# Patient Record
Sex: MALE | Race: WHITE | Employment: UNEMPLOYED | URBAN - METROPOLITAN AREA
[De-identification: names, ages, dates, MRNs, and addresses within clinical notes are randomized per-mention and may not be internally consistent; named-entity substitution may affect disease eponyms.]

---

## 2022-07-06 ENCOUNTER — HOSPITAL ENCOUNTER (INPATIENT)
Age: 32
LOS: 2 days | Discharge: LEFT BEFORE DISCHARGE | DRG: 244 | End: 2022-07-08
Attending: EMERGENCY MEDICINE | Admitting: INTERNAL MEDICINE
Payer: MEDICAID

## 2022-07-06 ENCOUNTER — APPOINTMENT (OUTPATIENT)
Dept: CT IMAGING | Age: 32
DRG: 244 | End: 2022-07-06
Payer: MEDICAID

## 2022-07-06 DIAGNOSIS — K57.20 DIVERTICULITIS OF COLON WITH PERFORATION: Primary | ICD-10-CM

## 2022-07-06 LAB
ABSOLUTE EOS #: 0.23 K/UL (ref 0–0.4)
ABSOLUTE LYMPH #: 2.55 K/UL (ref 1–4.8)
ABSOLUTE MONO #: 1.86 K/UL (ref 0.1–1.3)
ALBUMIN SERPL-MCNC: 4 G/DL (ref 3.5–5.2)
ALP BLD-CCNC: 71 U/L (ref 40–129)
ALT SERPL-CCNC: 11 U/L (ref 5–41)
ANION GAP SERPL CALCULATED.3IONS-SCNC: 10 MMOL/L (ref 9–17)
AST SERPL-CCNC: 8 U/L
BASOPHILS # BLD: 0 % (ref 0–2)
BASOPHILS ABSOLUTE: 0 K/UL (ref 0–0.2)
BILIRUB SERPL-MCNC: 0.56 MG/DL (ref 0.3–1.2)
BUN BLDV-MCNC: 10 MG/DL (ref 6–20)
CALCIUM SERPL-MCNC: 9.6 MG/DL (ref 8.6–10.4)
CHLORIDE BLD-SCNC: 97 MMOL/L (ref 98–107)
CO2: 28 MMOL/L (ref 20–31)
CREAT SERPL-MCNC: 0.83 MG/DL (ref 0.7–1.2)
EOSINOPHILS RELATIVE PERCENT: 1 % (ref 0–4)
GFR AFRICAN AMERICAN: >60 ML/MIN
GFR NON-AFRICAN AMERICAN: >60 ML/MIN
GFR SERPL CREATININE-BSD FRML MDRD: ABNORMAL ML/MIN/{1.73_M2}
GLUCOSE BLD-MCNC: 113 MG/DL (ref 70–99)
HCT VFR BLD CALC: 44.8 % (ref 41–53)
HEMOGLOBIN: 15.1 G/DL (ref 13.5–17.5)
LIPASE: 10 U/L (ref 13–60)
LYMPHOCYTES # BLD: 11 % (ref 24–44)
MCH RBC QN AUTO: 29.8 PG (ref 26–34)
MCHC RBC AUTO-ENTMCNC: 33.7 G/DL (ref 31–37)
MCV RBC AUTO: 88.5 FL (ref 80–100)
MONOCYTES # BLD: 8 % (ref 1–7)
MORPHOLOGY: NORMAL
PDW BLD-RTO: 13.3 % (ref 11.5–14.9)
PLATELET # BLD: 436 K/UL (ref 150–450)
PMV BLD AUTO: 7.1 FL (ref 6–12)
POTASSIUM SERPL-SCNC: 3.9 MMOL/L (ref 3.7–5.3)
RBC # BLD: 5.06 M/UL (ref 4.5–5.9)
SEG NEUTROPHILS: 80 % (ref 36–66)
SEGMENTED NEUTROPHILS ABSOLUTE COUNT: 18.56 K/UL (ref 1.3–9.1)
SODIUM BLD-SCNC: 135 MMOL/L (ref 135–144)
TOTAL PROTEIN: 8.1 G/DL (ref 6.4–8.3)
WBC # BLD: 23.2 K/UL (ref 3.5–11)

## 2022-07-06 PROCEDURE — 85025 COMPLETE CBC W/AUTO DIFF WBC: CPT

## 2022-07-06 PROCEDURE — 83690 ASSAY OF LIPASE: CPT

## 2022-07-06 PROCEDURE — 2060000000 HC ICU INTERMEDIATE R&B

## 2022-07-06 PROCEDURE — 80053 COMPREHEN METABOLIC PANEL: CPT

## 2022-07-06 PROCEDURE — 2580000003 HC RX 258: Performed by: PHYSICIAN ASSISTANT

## 2022-07-06 PROCEDURE — 36415 COLL VENOUS BLD VENIPUNCTURE: CPT

## 2022-07-06 PROCEDURE — 99223 1ST HOSP IP/OBS HIGH 75: CPT | Performed by: INTERNAL MEDICINE

## 2022-07-06 PROCEDURE — 74177 CT ABD & PELVIS W/CONTRAST: CPT

## 2022-07-06 PROCEDURE — 6360000002 HC RX W HCPCS: Performed by: SURGERY

## 2022-07-06 PROCEDURE — 2580000003 HC RX 258: Performed by: SURGERY

## 2022-07-06 PROCEDURE — 96374 THER/PROPH/DIAG INJ IV PUSH: CPT

## 2022-07-06 PROCEDURE — 2500000003 HC RX 250 WO HCPCS: Performed by: PHYSICIAN ASSISTANT

## 2022-07-06 PROCEDURE — 6360000004 HC RX CONTRAST MEDICATION: Performed by: PHYSICIAN ASSISTANT

## 2022-07-06 PROCEDURE — 99285 EMERGENCY DEPT VISIT HI MDM: CPT

## 2022-07-06 RX ORDER — FAMOTIDINE 10 MG/ML
20 INJECTION, SOLUTION INTRAVENOUS 2 TIMES DAILY
Status: DISCONTINUED | OUTPATIENT
Start: 2022-07-06 | End: 2022-07-08 | Stop reason: HOSPADM

## 2022-07-06 RX ORDER — ONDANSETRON 2 MG/ML
4 INJECTION INTRAMUSCULAR; INTRAVENOUS EVERY 6 HOURS PRN
Status: DISCONTINUED | OUTPATIENT
Start: 2022-07-06 | End: 2022-07-08 | Stop reason: HOSPADM

## 2022-07-06 RX ORDER — SODIUM CHLORIDE 0.9 % (FLUSH) 0.9 %
5-40 SYRINGE (ML) INJECTION PRN
Status: DISCONTINUED | OUTPATIENT
Start: 2022-07-06 | End: 2022-07-08 | Stop reason: HOSPADM

## 2022-07-06 RX ORDER — 0.9 % SODIUM CHLORIDE 0.9 %
1000 INTRAVENOUS SOLUTION INTRAVENOUS ONCE
Status: COMPLETED | OUTPATIENT
Start: 2022-07-06 | End: 2022-07-06

## 2022-07-06 RX ORDER — SODIUM CHLORIDE 9 MG/ML
INJECTION, SOLUTION INTRAVENOUS CONTINUOUS
Status: DISCONTINUED | OUTPATIENT
Start: 2022-07-06 | End: 2022-07-08 | Stop reason: HOSPADM

## 2022-07-06 RX ORDER — ACETAMINOPHEN 325 MG/1
650 TABLET ORAL EVERY 4 HOURS PRN
Status: DISCONTINUED | OUTPATIENT
Start: 2022-07-06 | End: 2022-07-08 | Stop reason: HOSPADM

## 2022-07-06 RX ORDER — SODIUM CHLORIDE 9 MG/ML
INJECTION, SOLUTION INTRAVENOUS PRN
Status: DISCONTINUED | OUTPATIENT
Start: 2022-07-06 | End: 2022-07-08 | Stop reason: HOSPADM

## 2022-07-06 RX ORDER — FENTANYL CITRATE 50 UG/ML
100 INJECTION, SOLUTION INTRAMUSCULAR; INTRAVENOUS
Status: DISCONTINUED | OUTPATIENT
Start: 2022-07-06 | End: 2022-07-08 | Stop reason: HOSPADM

## 2022-07-06 RX ORDER — ONDANSETRON 4 MG/1
4 TABLET, ORALLY DISINTEGRATING ORAL EVERY 8 HOURS PRN
Status: DISCONTINUED | OUTPATIENT
Start: 2022-07-06 | End: 2022-07-08 | Stop reason: HOSPADM

## 2022-07-06 RX ORDER — SODIUM CHLORIDE 0.9 % (FLUSH) 0.9 %
10 SYRINGE (ML) INJECTION PRN
Status: DISCONTINUED | OUTPATIENT
Start: 2022-07-06 | End: 2022-07-08 | Stop reason: HOSPADM

## 2022-07-06 RX ORDER — 0.9 % SODIUM CHLORIDE 0.9 %
80 INTRAVENOUS SOLUTION INTRAVENOUS ONCE
Status: COMPLETED | OUTPATIENT
Start: 2022-07-06 | End: 2022-07-06

## 2022-07-06 RX ORDER — SODIUM CHLORIDE 0.9 % (FLUSH) 0.9 %
5-40 SYRINGE (ML) INJECTION EVERY 12 HOURS SCHEDULED
Status: DISCONTINUED | OUTPATIENT
Start: 2022-07-06 | End: 2022-07-08 | Stop reason: HOSPADM

## 2022-07-06 RX ORDER — FENTANYL CITRATE 50 UG/ML
50 INJECTION, SOLUTION INTRAMUSCULAR; INTRAVENOUS
Status: DISCONTINUED | OUTPATIENT
Start: 2022-07-06 | End: 2022-07-08 | Stop reason: HOSPADM

## 2022-07-06 RX ADMIN — SODIUM CHLORIDE: 9 INJECTION, SOLUTION INTRAVENOUS at 17:16

## 2022-07-06 RX ADMIN — SODIUM CHLORIDE 80 ML: 9 INJECTION, SOLUTION INTRAVENOUS at 15:05

## 2022-07-06 RX ADMIN — SODIUM CHLORIDE, PRESERVATIVE FREE 10 ML: 5 INJECTION INTRAVENOUS at 15:05

## 2022-07-06 RX ADMIN — IOPAMIDOL 75 ML: 755 INJECTION, SOLUTION INTRAVENOUS at 15:02

## 2022-07-06 RX ADMIN — SODIUM CHLORIDE 1000 ML: 9 INJECTION, SOLUTION INTRAVENOUS at 14:17

## 2022-07-06 RX ADMIN — SODIUM CHLORIDE, PRESERVATIVE FREE 10 ML: 5 INJECTION INTRAVENOUS at 20:01

## 2022-07-06 RX ADMIN — FAMOTIDINE 20 MG: 10 INJECTION INTRAVENOUS at 20:01

## 2022-07-06 RX ADMIN — MEROPENEM 2000 MG: 1 INJECTION, POWDER, FOR SOLUTION INTRAVENOUS at 17:27

## 2022-07-06 ASSESSMENT — PAIN DESCRIPTION - ORIENTATION: ORIENTATION: LEFT

## 2022-07-06 ASSESSMENT — PAIN DESCRIPTION - FREQUENCY
FREQUENCY: CONTINUOUS
FREQUENCY: INTERMITTENT

## 2022-07-06 ASSESSMENT — PAIN - FUNCTIONAL ASSESSMENT: PAIN_FUNCTIONAL_ASSESSMENT: 0-10

## 2022-07-06 ASSESSMENT — PAIN DESCRIPTION - LOCATION
LOCATION: ABDOMEN

## 2022-07-06 ASSESSMENT — PAIN SCALES - GENERAL
PAINLEVEL_OUTOF10: 5
PAINLEVEL_OUTOF10: 7
PAINLEVEL_OUTOF10: 5

## 2022-07-06 ASSESSMENT — PAIN DESCRIPTION - DESCRIPTORS
DESCRIPTORS: ACHING
DESCRIPTORS: ACHING
DESCRIPTORS: PRESSURE

## 2022-07-06 ASSESSMENT — PAIN DESCRIPTION - PAIN TYPE
TYPE: ACUTE PAIN
TYPE: ACUTE PAIN

## 2022-07-06 NOTE — ED TRIAGE NOTES
Mode of arrival (squad #, walk in, police, etc) : walk in        Chief complaint(s): Constipation        Arrival Note (brief scenario, treatment PTA, etc). : Pt states he has had worsening constipation for 1.5-2 weeks. Pt has tried miralax, enema, stool softners, and mag citrate with no relief. C= \"Have you ever felt that you should Cut down on your drinking? \"  No  A= \"Have people Annoyed you by criticizing your drinking? \"  No  G= \"Have you ever felt bad or Guilty about your drinking? \"  No  E= \"Have you ever had a drink as an Eye-opener first thing in the morning to steady your nerves or to help a hangover? \"  No      Deferred []      Reason for deferring: N/A    *If yes to two or more: probable alcohol abuse. *

## 2022-07-06 NOTE — H&P
reports current alcohol use. He reports that he does not use drugs. HOME MEDICATIONS        Prior to Admission medications    Not on File       ALLERGIES      Patient has no known allergies. REVIEW OF SYSTEMS     Review of Systems   Constitutional: Negative for chills, diaphoresis and fever. HENT: Negative for congestion and sore throat. Respiratory: Negative for cough, shortness of breath and wheezing. Cardiovascular: Negative for chest pain, palpitations and leg swelling. Gastrointestinal: Positive for abdominal pain and constipation. Negative for diarrhea, nausea and vomiting. Genitourinary: Negative for dysuria, frequency and urgency. Musculoskeletal: Negative for back pain and myalgias. Skin: Negative for rash. Neurological: Negative for dizziness, weakness and headaches. Psychiatric/Behavioral: The patient is not nervous/anxious. PHYSICAL EXAM      BP (!) 144/91   Pulse 83   Temp 97.9 °F (36.6 °C) (Oral)   Resp 18   Ht 5' 10\" (1.778 m)   Wt 200 lb (90.7 kg)   SpO2 98%   BMI 28.70 kg/m²  Body mass index is 28.7 kg/m². Physical Exam  Constitutional:       General: He is not in acute distress. Appearance: He is well-developed. He is not diaphoretic. HENT:      Head: Normocephalic and atraumatic. Eyes:      Conjunctiva/sclera: Conjunctivae normal.      Pupils: Pupils are equal, round, and reactive to light. Neck:      Trachea: No tracheal deviation. Cardiovascular:      Rate and Rhythm: Normal rate and regular rhythm. Heart sounds: Normal heart sounds. No murmur heard. No friction rub. No gallop. Pulmonary:      Effort: Pulmonary effort is normal. No respiratory distress. Breath sounds: Normal breath sounds. No wheezing or rales. Chest:      Chest wall: No tenderness. Abdominal:      General: There is no distension. Palpations: Abdomen is soft. Tenderness: There is abdominal tenderness (LLQ; tingling over entire abdomen).  There is no guarding. Comments: Hypoactive bowel sounds x 4 quads   Musculoskeletal:         General: No tenderness. Normal range of motion. Cervical back: Normal range of motion and neck supple. Lymphadenopathy:      Cervical: No cervical adenopathy. Skin:     General: Skin is warm and dry. Coloration: Skin is not pale. Findings: No erythema or rash. Neurological:      Mental Status: He is alert and oriented to person, place, and time. Motor: No seizure activity. Coordination: Coordination normal.   Psychiatric:         Behavior: Behavior normal.         Thought Content: Thought content normal.       DIAGNOSTICS      EKG: none    Labs:  CBC:   Recent Labs     07/06/22  1415   WBC 23.2*   HGB 15.1        BMP:    Recent Labs     07/06/22  1415      K 3.9   CL 97*   CO2 28   BUN 10   CREATININE 0.83   GLUCOSE 113*     S. Calcium:  Recent Labs     07/06/22  1415   CALCIUM 9.6     S. Ionized Calcium:No results for input(s): IONCA in the last 72 hours. S. Magnesium:No results for input(s): MG in the last 72 hours. S. Phosphorus:No results for input(s): PHOS in the last 72 hours. S. Glucose:No results for input(s): POCGLU in the last 72 hours. Glycosylated hemoglobin A1C: No results found for: LABA1C  Hepatic:   Recent Labs     07/06/22  1415   AST 8   ALT 11   ALKPHOS 71     CARDIAC ENZY: No results for input(s): CKTOTAL, CKMB, CKMBINDEX, TROPHS, MYOGLOBIN in the last 72 hours. INR: No results for input(s): INR in the last 72 hours. BNP: No results for input(s): PROBNP in the last 72 hours. ABGs: No results for input(s): PH, PCO2, PO2, HCO3, O2SAT in the last 72 hours. Lipids: No results for input(s): CHOL, TRIG, HDL, LDL, LDLCALC in the last 72 hours. Pancreatic functions:  Recent Labs     07/06/22  1415   LIPASE 10*     S. LacticAcid: No results for input(s): LACTA in the last 72 hours.   Thyroid functions: No results found for: T4, TSH   U/A:No results for input(s): NITRITE, COLORU, WBCUA, RBCUA, MUCUS, BACTERIA, CLARITYU, SPECGRAV, LEUKOCYTESUR, BLOODU, GLUCOSEU, AMORPHOUS in the last 72 hours. Invalid input(s): Kimberly Heller  No results for input(s): COVID19 in the last 72 hours. Imaging/Diagonstics:     CT ABDOMEN PELVIS W IV CONTRAST Additional Contrast? None    Result Date: 7/6/2022  EXAMINATION: CT OF THE ABDOMEN AND PELVIS WITH CONTRAST 7/6/2022 2:55 pm TECHNIQUE: CT of the abdomen and pelvis was performed with the administration of intravenous contrast. Multiplanar reformatted images are provided for review. Automated exposure control, iterative reconstruction, and/or weight based adjustment of the mA/kV was utilized to reduce the radiation dose to as low as reasonably achievable. COMPARISON: None. HISTORY: ORDERING SYSTEM PROVIDED HISTORY: constipation, LLQ abd pain TECHNOLOGIST PROVIDED HISTORY: constipation, LLQ abd pain Decision Support Exception - unselect if not a suspected or confirmed emergency medical condition->Emergency Medical Condition (MA) Reason for Exam: constipation, LLQ abd pain Additional signs and symptoms: PT STATES HE HASN'T HAD A BOWEL MOVEMENT IN 10 DAYS, GENEALIZED ABDOMINAL PAIN FINDINGS: Lower Chest: No focal consolidation seen in the visualized lung bases. Organs: The visualized portions of the liver, gallbladder, spleen, pancreas and adrenal glands demonstrate no acute abnormality. No biliary ductal dilatation. The kidneys appear normal in size and demonstrate symmetric enhancement. No focal renal mass. No hydronephrosis. No perinephric stranding. GI/Bowel: No bowel dilatation is seen to suggest a bowel obstruction. No evidence of acute appendicitis. There is thickening of the sigmoid colon with a focal area of decreased attenuation adjacent to the sigmoid colonic wall measuring approximately 3.4 x 4.5 x 4.5 cm. This appears to demonstrate central foci of air with moderate surrounding inflammatory change. Pelvis:  The urinary bladder and prostate demonstrate no acute abnormality. Peritoneum/Retroperitoneum: The abdominal aorta is normal in caliber. No bulky retroperitoneal or mesenteric adenopathy. No gross free fluid or free air within the abdomen or pelvis. Bones/Soft Tissues: No acute osseous abnormality. 1. There is presumed contained perforated diverticulitis involving the sigmoid colon with a somewhat ill-defined presume fluid collection and tiny foci of air adjacent to the sigmoid colonic wall measuring up to 4.5 cm with surrounding inflammatory change. 2. Otherwise, no acute abnormality seen within the abdomen or pelvis. 3. No bowel dilatation is seen to suggest a bowel obstruction. ASSESSMENT  and  PLAN     Principal Problem:    Perforation of sigmoid colon due to diverticulitis  Resolved Problems:    * No resolved hospital problems. *    Plan:    Perforation of sigmoid colon due to diverticulitis  -2 week history abdominal pain and constipation  --precipitated by heavy lifting and eating large amounts of trail mix  -CT abdomen/pelvis - Presumed contained perforated  Diverticulitis involving sigmoid colon with a somewhat ill defined presumed fluid collection;   --tiny foci of air adjacent to sigmoid colonic wall- up to 4.5 cm with surrounding inflammatory change  --No other acute abnormality seen  --No bowel dilation to suggest obstruction  -WBC 23.2  -Meropenem initiated in ED  --Continue on admission  -General surgery consulted in ED  -NPO except ice chips  -NS at 150 ml/hr   -IR for percutaneous abscess drainage in am    Consultations:     [address] TIFFANY Arevalo - CNP   7/6/2022  7:48 PM    Kaela Harvey [address] [place] [address]. Phone [phone]     Attending Physician Statement  I have discussed the care of Zackery Purvis with the CNP.  I have examined the patient myself and taken ros and hpi , including pertinent

## 2022-07-06 NOTE — ED PROVIDER NOTES
[facility]  eMERGENCY dEPARTMENT eNCOUnter      Pt Name: Darin Spann  MRN: [id number]  [place] 1990  Date of evaluation: 7/6/2022  Provider: Kadeem Alcantara PA-C    CHIEF COMPLAINT       Chief Complaint   Patient presents with    Constipation           HISTORY OF PRESENT ILLNESS  (Location/Symptom, Timing/Onset, Context/Setting, Quality, Duration, Modifying Factors, Severity.)   Darin Spann is a 32 y.o. male who presents to the emergency department for evaluation of constipation and abdominal pain. Symptoms started 1-1/2 to 2 weeks ago. Patient states he is still able to have a bowel movement daily but it is small hard \"christiana. \"  Patient reports he is barely passing any gas. States he has been burping a lot. Patient states his abdominal pain initially started out in his right upper quadrant but has radiated down his stomach and is now into his left lower quadrant. He states yesterday he tried MiraLAX, stool softener, enema with no relief. Patient did go to [facility] early this morning where he was sent home with mag citrate. Patient states he did take it and threw up 3 hours later. Patient has not had a bowel movement since taking mag citrate. Patient denies any chest pain, shortness of breath or fevers. Patient reports he normally has a bowel movement once a day. He denies any medical history. No history of abdominal surgeries. No other complaints. Nursing Notes were reviewed. REVIEW OF SYSTEMS    (2-9 systems for level 4, 10 or more for level 5)     Review of Systems   abd pain  Constipation      Except as noted above the remainder of the review of systems was reviewed and negative. PAST MEDICAL HISTORY   History reviewed. No pertinent past medical history. None otherwise stated in nurses notes    SURGICAL HISTORY     History reviewed. No pertinent surgical history.   None otherwise stated in nurses notes    CURRENT MEDICATIONS       There are no discharge medications for this patient. ALLERGIES     Patient has no known allergies. FAMILY HISTORY     History reviewed. No pertinent family history. No family status information on file. None otherwise stated in nurses notes    SOCIAL HISTORY         lives at home with others     PHYSICAL EXAM    (up to 7 for level 4, 8 or more for level 5)     ED Triage Vitals [07/06/22 1343]   BP Temp Temp src Heart Rate Resp SpO2 Height Weight   136/79 98.2 °F (36.8 °C) -- 83 16 98 % -- --       Physical Exam   Nursing note and vitals reviewed. Constitutional: Oriented to person, place, and time and well-developed, well-nourished. Head: Normocephalic and atraumatic. Ear: External ears normal.   Nose: Nose normal and midline. Eyes: Conjunctivae and EOM are normal. Pupils are equal, round, and reactive to light. Neck: Normal range of motion. Neck supple. Cardiovascular: Normal rate, regular rhythm, normal heart sounds and intact distal pulses. Pulmonary/Chest: Effort normal and breath sounds normal. No respiratory distress. No wheezes. No rales. No chest tenderness. Abdominal: Soft. Bowel sounds are normal. No distension and no mass. There is no LLQ and suprapubic tenderness. There is no rebound and no guarding. No rigidity. Musculoskeletal: Normal range of motion. Neurological: Alert and oriented to person, place, and time. GCS score is 15. Skin: Skin is warm and dry. No rash noted. No erythema. No pallor. Psychiatric: Mood, memory, affect and judgment normal.           DIAGNOSTIC RESULTS     EKG: All EKG's are interpreted by the Emergency Department Physician who either signs or Co-signs this chart in the absence of a cardiologist.        RADIOLOGY:   All plain film, CT, MRI, and formal ultrasound images (except ED bedside ultrasound) are read by the radiologist, see reports below, unless otherwise noted in MDM or here. CT ABDOMEN PELVIS W IV CONTRAST Additional Contrast? None   Final Result   1. There is presumed contained perforated diverticulitis involving the   sigmoid colon with a somewhat ill-defined presume fluid collection and tiny   foci of air adjacent to the sigmoid colonic wall measuring up to 4.5 cm with   surrounding inflammatory change. 2. Otherwise, no acute abnormality seen within the abdomen or pelvis. 3. No bowel dilatation is seen to suggest a bowel obstruction. IR ABSCESS DRAINAGE PERC    (Results Pending)       No results found. LABS:  Labs Reviewed   CBC WITH AUTO DIFFERENTIAL - Abnormal; Notable for the following components:       Result Value    WBC 23.2 (*)     Seg Neutrophils 80 (*)     Lymphocytes 11 (*)     Monocytes 8 (*)     Segs Absolute 18.56 (*)     Absolute Mono # 1.86 (*)     All other components within normal limits   COMPREHENSIVE METABOLIC PANEL W/ REFLEX TO MG FOR LOW K - Abnormal; Notable for the following components:    Glucose 113 (*)     Chloride 97 (*)     All other components within normal limits   LIPASE - Abnormal; Notable for the following components:    Lipase 10 (*)     All other components within normal limits   CBC WITH AUTO DIFFERENTIAL   BASIC METABOLIC PANEL   PROTIME-INR       All other labs were within normal range or not returned as of this dictation.     EMERGENCY DEPARTMENT COURSE and DIFFERENTIAL DIAGNOSIS/MDM:   Vitals:    Vitals:    07/06/22 1441 07/06/22 1450 07/06/22 1712 07/06/22 1824   BP:   135/83 (!) 144/91   Pulse: 88 82 76 83   Resp:  17 17 18   Temp:  98.2 °F (36.8 °C) 98 °F (36.7 °C) 97.9 °F (36.6 °C)   TempSrc:  Oral Oral Oral   SpO2:  99% 99% 98%   Weight:       Height:             Patient instructed to return to the emergency room if symptoms worsen, return, or any other concern right away which is agreed by the patient    ED MEDS:  Orders Placed This Encounter   Medications    0.9 % sodium chloride bolus    sodium chloride flush 0.9 % injection 10 mL    iopamidol (ISOVUE-370) 76 % injection 75 mL    0.9 % sodium chloride bolus    0.9 % sodium chloride infusion    famotidine (PEPCID) injection 20 mg    ondansetron (ZOFRAN) injection 4 mg    DISCONTD: meropenem (MERREM) 1,000 mg in sodium chloride 0.9 % 100 mL IVPB     Order Specific Question:   Antimicrobial Indications     Answer:   Intra-Abdominal Infection    fentaNYL (SUBLIMAZE) injection 50 mcg    fentaNYL (SUBLIMAZE) injection 100 mcg    acetaminophen (TYLENOL) tablet 650 mg    FOLLOWED BY Linked Order Group     meropenem (MERREM) 2,000 mg in sodium chloride 0.9 % 100 mL IVPB      Order Specific Question:   Antimicrobial Indications      Answer:   Intra-Abdominal Infection     meropenem (MERREM) 1,000 mg in sodium chloride 0.9 % 100 mL IVPB (mini-bag)      Order Specific Question:   Antimicrobial Indications      Answer:   Intra-Abdominal Infection    sodium chloride flush 0.9 % injection 5-40 mL    sodium chloride flush 0.9 % injection 5-40 mL    0.9 % sodium chloride infusion    acetaminophen (TYLENOL) tablet 650 mg    OR Linked Order Group     ondansetron (ZOFRAN-ODT) disintegrating tablet 4 mg     ondansetron (ZOFRAN) injection 4 mg         CONSULTS:  IP CONSULT TO GENERAL SURGERY  IP CONSULT TO INTERNAL MEDICINE    PROCEDURES:  None      FINAL IMPRESSION      1. Diverticulitis of colon with perforation          DISPOSITION/PLAN   DISPOSITION Admitted    PATIENT REFERRED TO:  No follow-up provider specified. DISCHARGE MEDICATIONS:  There are no discharge medications for this patient. Summation      Patient Course:    Constipation x 2 weeks. Still passing some gas and able to have small hard BM. Pt tried mag citrate this morning but threw it up. Labs show WBC of 23. Ct scan is showing perforated sigmoid diverticulitis with abscess. I spoke with dr. Shahzad Willis. He would like patient admitted primarily to medicine. He will be on as a consult. He will also consult IR. Pt started on abx.    I spoke with Dr. Jaye Bustillo who accepted admission. Residents did not return page. Pt updated on plan. He is agreeable. Dr. Anuradha Walden evaluated patient in ED. The care is provided during an unprecedented national emergency due to the novel coronavirus, COVID-19. ED Medications administered this visit:    Medications   sodium chloride flush 0.9 % injection 10 mL (10 mLs IntraVENous Given 7/6/22 1505)   0.9 % sodium chloride infusion ( IntraVENous New Bag 7/6/22 1716)   famotidine (PEPCID) injection 20 mg (has no administration in time range)   ondansetron (ZOFRAN) injection 4 mg (has no administration in time range)   fentaNYL (SUBLIMAZE) injection 50 mcg (has no administration in time range)   fentaNYL (SUBLIMAZE) injection 100 mcg (has no administration in time range)   acetaminophen (TYLENOL) tablet 650 mg (has no administration in time range)   meropenem (MERREM) 2,000 mg in sodium chloride 0.9 % 100 mL IVPB (2,000 mg IntraVENous New Bag 7/6/22 1727)     Followed by   meropenem (MERREM) 1,000 mg in sodium chloride 0.9 % 100 mL IVPB (mini-bag) (has no administration in time range)   sodium chloride flush 0.9 % injection 5-40 mL (has no administration in time range)   sodium chloride flush 0.9 % injection 5-40 mL (has no administration in time range)   0.9 % sodium chloride infusion (has no administration in time range)   acetaminophen (TYLENOL) tablet 650 mg (has no administration in time range)   ondansetron (ZOFRAN-ODT) disintegrating tablet 4 mg (has no administration in time range)     Or   ondansetron (ZOFRAN) injection 4 mg (has no administration in time range)   0.9 % sodium chloride bolus (0 mLs IntraVENous Stopped 7/6/22 1530)   iopamidol (ISOVUE-370) 76 % injection 75 mL (75 mLs IntraVENous Given 7/6/22 1502)   0.9 % sodium chloride bolus (0 mLs IntraVENous Stopped 7/6/22 1506)       New Prescriptions from this visit:    There are no discharge medications for this patient.       Follow-up:  No follow-up provider specified. Final Impression:   1.  Diverticulitis of colon with perforation               (Please note that portions of this note were completed with a voice recognition program )        Ventura Morgan, [address] PA-C  07/06/22 2381

## 2022-07-06 NOTE — ED PROVIDER NOTES
Attending Supervising Physicians Attestation Statement  The patient met the criteria for indirect supervision. I discussed the findings and plans with the physician assistant and agree as documented in her note .     Electronically signed by Ad Montilla MD on 7/6/22 at 2:17 PM EDT          Ad Montilla MD  07/06/22 3001

## 2022-07-06 NOTE — ED NOTES
TRANSFER - OUT REPORT:    Verbal report given to [address] [place] [address] on Bard Peabody  being transferred to [facility] 2089 for routine progression of patient care       Report consisted of patient's Situation, Background, Assessment and   Recommendations(SBAR). Information from the following report(s) Nurse Handoff Report was reviewed with the receiving nurse. Lines:   Peripheral IV 07/06/22 Right Antecubital (Active)    Norml saline 1Liter  150ml/hour infusing    Opportunity for questions and clarification was provided.       Patient transported with:  Frances Ferreira RN  99/06/14 7380

## 2022-07-06 NOTE — PROGRESS NOTES
Medication History completed:    New medications: None    Medications discontinued: None    Changes to dosing: None    Stated allergies: NKDA    Other pertinent information: Prior to the ER, the patient reports to try taking miralax, magnesium citrate, an enema and stool softener for constipation. The patient refuses taking any prescription, OTC, herbal supplement or illicit drug use.        Thank you,  Lisa Mayfield  PharmD candidate [id number]

## 2022-07-07 LAB
ABSOLUTE EOS #: 0.38 K/UL (ref 0–0.4)
ABSOLUTE LYMPH #: 2.87 K/UL (ref 1–4.8)
ABSOLUTE MONO #: 1.72 K/UL (ref 0.1–1.3)
ANION GAP SERPL CALCULATED.3IONS-SCNC: 15 MMOL/L (ref 9–17)
BASOPHILS # BLD: 1 % (ref 0–2)
BASOPHILS ABSOLUTE: 0.19 K/UL (ref 0–0.2)
BUN BLDV-MCNC: 7 MG/DL (ref 6–20)
CALCIUM SERPL-MCNC: 9 MG/DL (ref 8.6–10.4)
CHLORIDE BLD-SCNC: 100 MMOL/L (ref 98–107)
CO2: 22 MMOL/L (ref 20–31)
CREAT SERPL-MCNC: 0.83 MG/DL (ref 0.7–1.2)
EOSINOPHILS RELATIVE PERCENT: 2 % (ref 0–4)
GFR AFRICAN AMERICAN: >60 ML/MIN
GFR NON-AFRICAN AMERICAN: >60 ML/MIN
GFR SERPL CREATININE-BSD FRML MDRD: ABNORMAL ML/MIN/{1.73_M2}
GLUCOSE BLD-MCNC: 105 MG/DL (ref 70–99)
HCT VFR BLD CALC: 45.4 % (ref 41–53)
HEMOGLOBIN: 14.8 G/DL (ref 13.5–17.5)
INR BLD: 1.1
LYMPHOCYTES # BLD: 15 % (ref 24–44)
MCH RBC QN AUTO: 29.8 PG (ref 26–34)
MCHC RBC AUTO-ENTMCNC: 32.5 G/DL (ref 31–37)
MCV RBC AUTO: 91.9 FL (ref 80–100)
MONOCYTES # BLD: 9 % (ref 1–7)
MORPHOLOGY: NORMAL
PDW BLD-RTO: 13.3 % (ref 11.5–14.9)
PLATELET # BLD: 398 K/UL (ref 150–450)
PMV BLD AUTO: 7.4 FL (ref 6–12)
POTASSIUM SERPL-SCNC: 3.8 MMOL/L (ref 3.7–5.3)
PROTHROMBIN TIME: 13.7 SEC (ref 11.8–14.6)
RBC # BLD: 4.94 M/UL (ref 4.5–5.9)
SEG NEUTROPHILS: 73 % (ref 36–66)
SEGMENTED NEUTROPHILS ABSOLUTE COUNT: 13.94 K/UL (ref 1.3–9.1)
SODIUM BLD-SCNC: 137 MMOL/L (ref 135–144)
WBC # BLD: 19.1 K/UL (ref 3.5–11)

## 2022-07-07 PROCEDURE — 85610 PROTHROMBIN TIME: CPT

## 2022-07-07 PROCEDURE — 2060000000 HC ICU INTERMEDIATE R&B

## 2022-07-07 PROCEDURE — 36415 COLL VENOUS BLD VENIPUNCTURE: CPT

## 2022-07-07 PROCEDURE — 85025 COMPLETE CBC W/AUTO DIFF WBC: CPT

## 2022-07-07 PROCEDURE — 2580000003 HC RX 258: Performed by: SURGERY

## 2022-07-07 PROCEDURE — 6360000002 HC RX W HCPCS: Performed by: SURGERY

## 2022-07-07 PROCEDURE — 2500000003 HC RX 250 WO HCPCS: Performed by: PHYSICIAN ASSISTANT

## 2022-07-07 PROCEDURE — 2580000003 HC RX 258: Performed by: PHYSICIAN ASSISTANT

## 2022-07-07 PROCEDURE — 80048 BASIC METABOLIC PNL TOTAL CA: CPT

## 2022-07-07 RX ORDER — ENOXAPARIN SODIUM 100 MG/ML
40 INJECTION SUBCUTANEOUS DAILY
Status: DISCONTINUED | OUTPATIENT
Start: 2022-07-07 | End: 2022-07-08 | Stop reason: HOSPADM

## 2022-07-07 RX ADMIN — MEROPENEM 1000 MG: 1 INJECTION, POWDER, FOR SOLUTION INTRAVENOUS at 08:37

## 2022-07-07 RX ADMIN — MEROPENEM 1000 MG: 1 INJECTION, POWDER, FOR SOLUTION INTRAVENOUS at 00:21

## 2022-07-07 RX ADMIN — FAMOTIDINE 20 MG: 10 INJECTION INTRAVENOUS at 20:30

## 2022-07-07 RX ADMIN — FAMOTIDINE 20 MG: 10 INJECTION INTRAVENOUS at 08:34

## 2022-07-07 RX ADMIN — SODIUM CHLORIDE: 9 INJECTION, SOLUTION INTRAVENOUS at 12:29

## 2022-07-07 RX ADMIN — SODIUM CHLORIDE, PRESERVATIVE FREE 10 ML: 5 INJECTION INTRAVENOUS at 20:30

## 2022-07-07 RX ADMIN — SODIUM CHLORIDE: 9 INJECTION, SOLUTION INTRAVENOUS at 19:15

## 2022-07-07 RX ADMIN — MEROPENEM 1000 MG: 1 INJECTION, POWDER, FOR SOLUTION INTRAVENOUS at 17:08

## 2022-07-07 ASSESSMENT — ENCOUNTER SYMPTOMS
VOMITING: 0
SORE THROAT: 0
ABDOMINAL PAIN: 1
BACK PAIN: 0
CONSTIPATION: 1
SHORTNESS OF BREATH: 0
DIARRHEA: 0
COUGH: 0
WHEEZING: 0
NAUSEA: 0

## 2022-07-07 ASSESSMENT — PAIN SCALES - GENERAL: PAINLEVEL_OUTOF10: 4

## 2022-07-07 NOTE — PROGRESS NOTES
Case discussed with interventional radiology. Abscess to deep in the pelvis not a good access to drain the fluid collection in the pelvis. Continue IV antibiotics and repeat CT scan in a few days. Repeat CBC in the morning. Clear liquids today.

## 2022-07-07 NOTE — PROGRESS NOTES
Patient was seen and examined. Afebrile vital signs are stable. No new changes. Still has some abdominal pain. Voiding well. Abdomen is soft lower abdominal tenderness no peritoneal signs. Extremity nontender. Blood work reviewed. BMP normal.  WBC count is improved to 19.1. Hemoglobin 14.8. Continue bowel rest.  Ice chips. Interventional radiology consulted for percutaneous drainage today. Continue to hold Lovenox for now. IV antibiotics IV hydration. Repeat blood work in the morning.

## 2022-07-07 NOTE — PROGRESS NOTES
RN called to patient's bedside by the family reporting that the patient \"is throwing up blood\". RN looked in the toilet to find a quarter sized pink-tinged phlegm. Bowel sounds active, patient reports no change in abdominal pain. No nausea reported. RN will continue to monitor. Patient educated on calling out for any changes that may occur.

## 2022-07-07 NOTE — CARE COORDINATION
CASE MANAGEMENT NOTE:    Admission Date:  7/6/2022 Matt Camejo is a 32 y.o.  male    Admitted for : Diverticulitis of colon with perforation [K57.20]  Perforation of sigmoid colon due to diverticulitis [K57.20]    Met with:  Patient    PCP:  None, Information given for [facility], per pt. request                                Insurance:  Pending Medicaid       Is patient alert and oriented at time of discussion:  Yes    Current Residence/ Living Arrangements:  independently at home  W/ Grandparents         Current Services PTA:  No    Does patient go to outpatient dialysis: No  If yes, location and chair time: NA    Is patient agreeable to VNS: No    Freedom of choice provided:  No    List of [address] provided: No    VNS chosen:  No    DME:  none    Home Oxygen: No    Nebulizer: No    CPAP/BIPAP: No    Supplier: N/A    Potential Assistance Needed: Follow for med assist.    SNF needed: No    Freedom of choice and list provided: NA    Pharmacy:  [address] [place] [address] on [facility]       Is patient currently receiving oral anticoagulation therapy? No    Is the Patient an [facility]ALE[facility] with Readmission Risk Score greater than 14%? No  If yes, pt needs a follow up appointment made within 7 days. Family Members/Caregivers that pt would like involved in their care:    Yes    If yes, list name here:  [address] [place] [address]    Transportation Provider:  Patient             Discharge Plan:  7/7/22 Pending Medicaid. HELP following. Lives in 2 story home w/ Grandparents. No DME. No PCP, Information given for [facility] for him to call, per his request. WBC 19.1, IV Merrem, IR unable to place Plan B Labs. Surgery following.  Follow for med assist or any other needs//KB                 Electronically signed by: Dorie Yates RN on 7/7/2022 at 9:46 AM

## 2022-07-07 NOTE — PLAN OF CARE
Problem: Safety - Adult  Goal: Free from fall injury  7/7/2022 0322 by Joaquina Helm RN  Outcome: Progressing     No falls noted this shift. Patient ambulates independently in room. Bed kept in low position. Family at bedside, spent the night. Safe environment maintained. Bedside table & call light in reach. Uses call light appropriately when needing assistance. Problem: Skin/Tissue Integrity  Goal: Absence of new skin breakdown  Description: 1. Monitor for areas of redness and/or skin breakdown  2. Assess vascular access sites hourly  3. Every 4-6 hours minimum:  Change oxygen saturation probe site  4. Every 4-6 hours:  If on nasal continuous positive airway pressure, respiratory therapy assess nares and determine need for appliance change or resting period. Outcome: Progressing     No new signs of skin breakdown noted this shift.      Problem: Pain  Goal: Verbalizes/displays adequate comfort level or baseline comfort level  7/7/2022 0322 by Joaquina Helm RN  Outcome: Progressing     Problem: Discharge Planning  Goal: Discharge to home or other facility with appropriate resources  7/7/2022 0322 by Joaquina Helm RN  Outcome: Progressing

## 2022-07-07 NOTE — PROGRESS NOTES
Per Dr Ricky Esquivel requested drain placement is not possible.  \"not accessible\" Job Danielle updated

## 2022-07-07 NOTE — PROGRESS NOTES
Patient refusing telemetry. Patient educated on its purpose and the risks related to not wearing the monitor. Patient still refusing. Provider notified.

## 2022-07-08 ENCOUNTER — APPOINTMENT (OUTPATIENT)
Dept: VASCULAR LAB | Age: 32
DRG: 244 | End: 2022-07-08
Payer: MEDICAID

## 2022-07-08 VITALS
DIASTOLIC BLOOD PRESSURE: 91 MMHG | BODY MASS INDEX: 28.94 KG/M2 | TEMPERATURE: 98.8 F | HEART RATE: 68 BPM | SYSTOLIC BLOOD PRESSURE: 144 MMHG | HEIGHT: 70 IN | RESPIRATION RATE: 18 BRPM | WEIGHT: 202.16 LBS | OXYGEN SATURATION: 100 %

## 2022-07-08 LAB
ABSOLUTE EOS #: 0.2 K/UL (ref 0–0.4)
ABSOLUTE LYMPH #: 1.9 K/UL (ref 1–4.8)
ABSOLUTE MONO #: 1.2 K/UL (ref 0.1–1.3)
ANION GAP SERPL CALCULATED.3IONS-SCNC: 11 MMOL/L (ref 9–17)
BASOPHILS # BLD: 1 % (ref 0–2)
BASOPHILS ABSOLUTE: 0.1 K/UL (ref 0–0.2)
BUN BLDV-MCNC: 6 MG/DL (ref 6–20)
CALCIUM SERPL-MCNC: 9.4 MG/DL (ref 8.6–10.4)
CHLORIDE BLD-SCNC: 99 MMOL/L (ref 98–107)
CO2: 27 MMOL/L (ref 20–31)
CREAT SERPL-MCNC: 0.79 MG/DL (ref 0.7–1.2)
EOSINOPHILS RELATIVE PERCENT: 1 % (ref 0–4)
GFR AFRICAN AMERICAN: >60 ML/MIN
GFR NON-AFRICAN AMERICAN: >60 ML/MIN
GFR SERPL CREATININE-BSD FRML MDRD: ABNORMAL ML/MIN/{1.73_M2}
GLUCOSE BLD-MCNC: 106 MG/DL (ref 70–99)
HCT VFR BLD CALC: 42.7 % (ref 41–53)
HEMOGLOBIN: 14.5 G/DL (ref 13.5–17.5)
LYMPHOCYTES # BLD: 15 % (ref 24–44)
MCH RBC QN AUTO: 30 PG (ref 26–34)
MCHC RBC AUTO-ENTMCNC: 33.8 G/DL (ref 31–37)
MCV RBC AUTO: 88.7 FL (ref 80–100)
MONOCYTES # BLD: 10 % (ref 1–7)
PDW BLD-RTO: 13.3 % (ref 11.5–14.9)
PLATELET # BLD: 407 K/UL (ref 150–450)
PMV BLD AUTO: 7.3 FL (ref 6–12)
POTASSIUM SERPL-SCNC: 4.3 MMOL/L (ref 3.7–5.3)
RBC # BLD: 4.82 M/UL (ref 4.5–5.9)
SEG NEUTROPHILS: 73 % (ref 36–66)
SEGMENTED NEUTROPHILS ABSOLUTE COUNT: 8.9 K/UL (ref 1.3–9.1)
SODIUM BLD-SCNC: 137 MMOL/L (ref 135–144)
WBC # BLD: 12.2 K/UL (ref 3.5–11)

## 2022-07-08 PROCEDURE — 2580000003 HC RX 258: Performed by: SURGERY

## 2022-07-08 PROCEDURE — 93971 EXTREMITY STUDY: CPT

## 2022-07-08 PROCEDURE — 2500000003 HC RX 250 WO HCPCS: Performed by: PHYSICIAN ASSISTANT

## 2022-07-08 PROCEDURE — 85025 COMPLETE CBC W/AUTO DIFF WBC: CPT

## 2022-07-08 PROCEDURE — 2580000003 HC RX 258: Performed by: PHYSICIAN ASSISTANT

## 2022-07-08 PROCEDURE — 80048 BASIC METABOLIC PNL TOTAL CA: CPT

## 2022-07-08 PROCEDURE — 6360000002 HC RX W HCPCS: Performed by: SURGERY

## 2022-07-08 PROCEDURE — 36415 COLL VENOUS BLD VENIPUNCTURE: CPT

## 2022-07-08 PROCEDURE — 99232 SBSQ HOSP IP/OBS MODERATE 35: CPT | Performed by: INTERNAL MEDICINE

## 2022-07-08 RX ORDER — CIPROFLOXACIN 500 MG/1
500 TABLET, FILM COATED ORAL 2 TIMES DAILY
Qty: 28 TABLET | Refills: 0 | Status: SHIPPED | OUTPATIENT
Start: 2022-07-08 | End: 2022-07-22

## 2022-07-08 RX ORDER — METRONIDAZOLE 500 MG/1
500 TABLET ORAL 3 TIMES DAILY
Qty: 42 TABLET | Refills: 0 | Status: SHIPPED | OUTPATIENT
Start: 2022-07-08 | End: 2022-07-22

## 2022-07-08 RX ORDER — CIPROFLOXACIN 500 MG/1
500 TABLET, FILM COATED ORAL EVERY 12 HOURS SCHEDULED
Status: DISCONTINUED | OUTPATIENT
Start: 2022-07-08 | End: 2022-07-08 | Stop reason: HOSPADM

## 2022-07-08 RX ORDER — METRONIDAZOLE 500 MG/1
500 TABLET ORAL EVERY 8 HOURS SCHEDULED
Status: DISCONTINUED | OUTPATIENT
Start: 2022-07-08 | End: 2022-07-08 | Stop reason: HOSPADM

## 2022-07-08 RX ORDER — ENOXAPARIN SODIUM 100 MG/ML
40 INJECTION SUBCUTANEOUS DAILY
Status: DISCONTINUED | OUTPATIENT
Start: 2022-07-08 | End: 2022-07-08

## 2022-07-08 RX ORDER — ONDANSETRON 4 MG/1
TABLET, FILM COATED ORAL
Qty: 20 TABLET | Refills: 0 | Status: SHIPPED | OUTPATIENT
Start: 2022-07-08 | End: 2022-07-28

## 2022-07-08 RX ADMIN — FAMOTIDINE 20 MG: 10 INJECTION INTRAVENOUS at 10:18

## 2022-07-08 RX ADMIN — MEROPENEM 1000 MG: 1 INJECTION, POWDER, FOR SOLUTION INTRAVENOUS at 10:17

## 2022-07-08 RX ADMIN — SODIUM CHLORIDE: 9 INJECTION, SOLUTION INTRAVENOUS at 05:43

## 2022-07-08 RX ADMIN — MEROPENEM 1000 MG: 1 INJECTION, POWDER, FOR SOLUTION INTRAVENOUS at 00:55

## 2022-07-08 RX ADMIN — SODIUM CHLORIDE, PRESERVATIVE FREE 10 ML: 5 INJECTION INTRAVENOUS at 10:18

## 2022-07-08 ASSESSMENT — PAIN DESCRIPTION - FREQUENCY: FREQUENCY: CONTINUOUS

## 2022-07-08 ASSESSMENT — PAIN SCALES - GENERAL
PAINLEVEL_OUTOF10: 0
PAINLEVEL_OUTOF10: 0

## 2022-07-08 NOTE — PLAN OF CARE
Problem: Discharge Planning  Goal: Discharge to home or other facility with appropriate resources  Outcome: Progressing     Problem: Pain  Goal: Verbalizes/displays adequate comfort level or baseline comfort level  Outcome: Progressing  Flowsheets (Taken 7/8/2022 7885)  Verbalizes/displays adequate comfort level or baseline comfort level:   Assess pain using appropriate pain scale   Encourage patient to monitor pain and request assistance     Problem: Safety - Adult  Goal: Free from fall injury  Outcome: Progressing  Note: Gait steady      Problem: Skin/Tissue Integrity  Goal: Absence of new skin breakdown  Description: 1. Monitor for areas of redness and/or skin breakdown  2. Assess vascular access sites hourly  3. Every 4-6 hours minimum:  Change oxygen saturation probe site  4. Every 4-6 hours:  If on nasal continuous positive airway pressure, respiratory therapy assess nares and determine need for appliance change or resting period.   Outcome: Progressing

## 2022-07-08 NOTE — PROGRESS NOTES
RN notified Dr. Trixie Meehan that patient had requested peripheral IV be removed. Patient requests that IV antibiotics be changed to oral since discharge in planned tomorrow. MD states that patient is to remain on IV antibiotics.

## 2022-07-08 NOTE — CARE COORDINATION
DISCHARGE PLANNING NOTE:  CT abdomen and pelvis scheduled at [facility] for Thursday July 14,2022 @9:00 am . Meliton Louis at 8:45 am to main registration , Nothing by mouth after 7 am.   Electronically signed by Rupert Bhatia RN on 7/8/2022 at 1:38 PM

## 2022-07-08 NOTE — PROGRESS NOTES
Patient was seen and examined. Feels 100% better. No significant pain. No bowel movement yet. Tolerating liquids. Afebrile vital signs are stable. Abdomen is soft nondistended mild lower abdominal tenderness nothing acute. Extremity nontender. Blood work reviewed. WBC count is significantly improved. Hemoglobin stable. BMP unremarkable. Full liquid diet. Reduce IV fluid rate. Continue IV antibiotics. Hopefully discharge to home tomorrow on oral antibiotics. May start Lovenox for DVT prophylaxis. Discussed with patient family and the nursing staff.

## 2022-07-08 NOTE — PROGRESS NOTES
[place] [id number] Internal Medicine    Progress Note    2022    12:35 PM    Name:   Roberto Carlos Salazar  MRN:     [id number]     Acct:      [de-identified]   Room:     IP Day:  2  Admit Date:  2022  1:38 PM    PCP:   No primary care provider on file. Code Status:  Full Code    Subjective:     C/C:   Chief Complaint   Patient presents with    Constipation         Interval History Status: Improving    HPI:     26-year-old male presenting with constipation and abdominal pain for prior 2 weeks CT scan showed perforated diverticulitis with adjacent abscess    Review of Systems:     Denies any shortness of breath or cough  Denies chest pain or palpitations  Abdominal pain is better, no diarrhea no vomiting  Denies any new numbness tremors or weakness. Medications: Allergies:  No Known Allergies    Current Meds:   Scheduled Meds:    enoxaparin  40 mg SubCUTAneous Daily    famotidine (PEPCID) injection  20 mg IntraVENous BID    meropenem  1,000 mg IntraVENous Q8H    sodium chloride flush  5-40 mL IntraVENous 2 times per day     Continuous Infusions:    sodium chloride 150 mL/hr at 22 0543    sodium chloride       PRN Meds: sodium chloride flush, ondansetron, fentanNYL, fentanNYL, acetaminophen, sodium chloride flush, sodium chloride, acetaminophen, ondansetron **OR** ondansetron    Data:     Past Medical History:   has no past medical history on file. Social History:   reports that he has never smoked. He has never used smokeless tobacco. He reports current alcohol use. He reports that he does not use drugs. Family History: History reviewed. No pertinent family history.     Vitals:  BP (!) 144/91   Pulse 68   Temp 98.8 °F (37.1 °C) (Oral)   Resp 18   Ht 5' 10\" (1.778 m)   Wt 202 lb 2.6 oz (91.7 kg)   SpO2 100%   BMI 29.01 kg/m²   Temp (24hrs), Av.9 °F (37.2 °C), Min:98.6 °F (37 °C), Max:99.1 °F (37.3 °C)    No results for input(s): POCGLU in the last 72 hours. I/O (24Hr): Intake/Output Summary (Last 24 hours) at 7/8/2022 1235  Last data filed at 7/8/2022 0910  Gross per 24 hour   Intake 240 ml   Output 550 ml   Net -310 ml       Labs:    Lab Results   Component Value Date    WBC 12.2 (H) 07/08/2022    HGB 14.5 07/08/2022    HCT 42.7 07/08/2022    MCV 88.7 07/08/2022     07/08/2022     Lab Results   Component Value Date/Time     07/08/2022 07:45 AM    K 4.3 07/08/2022 07:45 AM    CL 99 07/08/2022 07:45 AM    CO2 27 07/08/2022 07:45 AM    BUN 6 07/08/2022 07:45 AM    CREATININE 0.79 07/08/2022 07:45 AM    GLUCOSE 106 07/08/2022 07:45 AM    CALCIUM 9.4 07/08/2022 07:45 AM          No results found for: SPECIAL  No results found for: CULTURE      Radiology:    Recent data reviewed    Physical Examination:        General appearance:  alert, cooperative and no distress  Eyes: Anicteric sclera. Pupils are equally round and reactive to light. Extraocular movements are intact. Lungs:  clear to auscultation bilaterally, normal effort  Heart:  regular rate and rhythm, no murmur  Abdomen:  soft, nontender, nondistended, normal bowel sounds, no masses, hepatomegaly, splenomegaly  Extremities:  no edema, redness, tenderness in the calves, right arm swelling and tenderness  Skin:  no gross lesions, rashes, induration  Neuro:  Alert, oriented X 3, no new focal weakness  Assessment:        Primary Problem  Perforation of sigmoid colon due to diverticulitis    Active Hospital Problems    Diagnosis Date Noted    Perforation of sigmoid colon due to diverticulitis [K57.20] 07/06/2022     Priority: Medium           Plan:        68-year-old male presenting with left lower quadrant pain and vomiting recent constipation, noted to have perforated diverticulitis involving sigmoid colon on CT scan  No significant medical history  Started on antibiotics, pain improving, leukocytosis improving no fevers  1.  Contained perforated diverticulitis- started on broad-spectrum antibiotics, iv fluids, bowel rest Gen surg consulted  2. Fluid collection adjacent to sigmoid colon- requested IR drain placement however inaccessible and could not be done. Pt feeling much better, will c/w iv abx    7/8  Overall improving leukocytosis resolved no fevers feeling better minimal abdominal tenderness left lower quadrant  Continuing with IV antibiotics per surgical recommendations  Advancing diet  Tenderness and swelling right arm could be related to IV site but given area involved will get ultrasound Dopplers    Dispo:  Anticipate discharge tomorrow morning if tolerating soft diet tomorrow    Dina Navarrete MD  7/8/2022  12:35 PM

## 2022-07-08 NOTE — CARE COORDINATION
ONGOING DISCHARGE PLAN:    Patient is alert and oriented x4. Spoke with patient regarding discharge plan and patient confirms that plan is still to go home with GF.     IV Merrem , IV fluids, clear liquids. Surgery following. Repeat CT in a few days. IR unable to place [employer] eo tbeing to deep in abdomen. Will continue to follow for additional discharge needs.     Electronically signed by Iraida Murrell RN on 7/8/2022 at 10:10 AM

## 2022-07-08 NOTE — PROGRESS NOTES
Dr. Mohamud Dean at beside to discuss risks of not continuing IV antibiotics with RN present. Patient still refusing to allow attempts to gain IV access. When presented with options patient chose to leave AMA. Education provided regarding leaving AMA and patient still chose to leave. Paperwork signed willingly and on file in chart. Patient had already received antibiotic prescriptions that were called in by Dr. Mickey Saleem.

## 2022-07-08 NOTE — PROGRESS NOTES
Dr. Torie Duenas to unit rounding Orders received to advance diet, decrease IV fluids, and to arrange for CT outpatient next Thursday.

## 2022-07-08 NOTE — PROGRESS NOTES
Right arm PIV removed per patient request d/t complaints of pain in arm. States \"There's so much pressure in my arm and my hand feels swollen. And it's giving me a headache. \" No s/sx of infiltration, and IV had brisk blood return when flushed. When RN made attempt to place new IV, patient states \"But I want to go outside first.\" Patient and significant other educated that antibiotic treatment will be delayed, but they still request to go outside. Educated on unit policy about leaving the floor and told to go to nurse station to sign in and out. Ice pack provided for arm d/t pain.

## 2022-07-08 NOTE — PROGRESS NOTES
CLINICAL PHARMACY NOTE: MEDS TO BEDS    Total # of Prescriptions Filled: 3   The following medications were delivered to the patient:  · Ciprofloxacin HCL 500mg  · Metronidazole 500mg  · Ondansetron HCL 4mg    Additional Documentation:    Delivered medications to patients room

## 2022-07-08 NOTE — PROGRESS NOTES
Patient is refusing additional attempts to obtain IV access. 1 attempt was made without success, and now patient states \"I'm refusing. I'm done being a pin cushion. \" Education provided regarding importance of IV antibiotic tx to patient and family, but patient continues to refuse. Dr. Bustamante Officer and Dr. Luiza Ivey notified.

## 2022-07-12 ENCOUNTER — APPOINTMENT (OUTPATIENT)
Dept: CT IMAGING | Age: 32
End: 2022-07-12
Payer: MEDICAID

## 2022-07-12 ENCOUNTER — HOSPITAL ENCOUNTER (EMERGENCY)
Age: 32
Discharge: HOME | End: 2022-07-12
Attending: EMERGENCY MEDICINE
Payer: MEDICAID

## 2022-07-12 VITALS
TEMPERATURE: 97.7 F | WEIGHT: 200 LBS | HEART RATE: 77 BPM | OXYGEN SATURATION: 96 % | SYSTOLIC BLOOD PRESSURE: 139 MMHG | DIASTOLIC BLOOD PRESSURE: 87 MMHG | RESPIRATION RATE: 20 BRPM | BODY MASS INDEX: 28.63 KG/M2 | HEIGHT: 70 IN

## 2022-07-12 DIAGNOSIS — B00.9 HSV (HERPES SIMPLEX VIRUS) INFECTION: ICD-10-CM

## 2022-07-12 DIAGNOSIS — K57.80 PERFORATED DIVERTICULUM: Primary | ICD-10-CM

## 2022-07-12 LAB
ABSOLUTE EOS #: 0.18 K/UL (ref 0–0.44)
ABSOLUTE IMMATURE GRANULOCYTE: 0.06 K/UL (ref 0–0.3)
ABSOLUTE LYMPH #: 2.27 K/UL (ref 1.1–3.7)
ABSOLUTE MONO #: 1.34 K/UL (ref 0.1–1.2)
ALBUMIN SERPL-MCNC: 4.6 G/DL (ref 3.5–5.2)
ALBUMIN/GLOBULIN RATIO: 1.3 (ref 1–2.5)
ALP BLD-CCNC: 66 U/L (ref 40–129)
ALT SERPL-CCNC: 30 U/L (ref 5–41)
ANION GAP SERPL CALCULATED.3IONS-SCNC: 14 MMOL/L (ref 9–17)
AST SERPL-CCNC: 25 U/L
BASOPHILS # BLD: 1 % (ref 0–2)
BASOPHILS ABSOLUTE: 0.1 K/UL (ref 0–0.2)
BILIRUB SERPL-MCNC: 0.44 MG/DL (ref 0.3–1.2)
BUN BLDV-MCNC: 8 MG/DL (ref 6–20)
CALCIUM SERPL-MCNC: 9.7 MG/DL (ref 8.6–10.4)
CHLORIDE BLD-SCNC: 98 MMOL/L (ref 98–107)
CO2: 26 MMOL/L (ref 20–31)
CREAT SERPL-MCNC: 0.98 MG/DL (ref 0.7–1.2)
EOSINOPHILS RELATIVE PERCENT: 2 % (ref 1–4)
GFR AFRICAN AMERICAN: >60 ML/MIN
GFR NON-AFRICAN AMERICAN: >60 ML/MIN
GFR SERPL CREATININE-BSD FRML MDRD: ABNORMAL ML/MIN/{1.73_M2}
GLUCOSE BLD-MCNC: 107 MG/DL (ref 70–99)
HCT VFR BLD CALC: 49.5 % (ref 40.7–50.3)
HEMOGLOBIN: 16.6 G/DL (ref 13–17)
IMMATURE GRANULOCYTES: 1 %
LIPASE: 28 U/L (ref 13–60)
LYMPHOCYTES # BLD: 21 % (ref 24–43)
MCH RBC QN AUTO: 29.4 PG (ref 25.2–33.5)
MCHC RBC AUTO-ENTMCNC: 33.5 G/DL (ref 28.4–34.8)
MCV RBC AUTO: 87.8 FL (ref 82.6–102.9)
MONOCYTES # BLD: 13 % (ref 3–12)
NRBC AUTOMATED: 0 PER 100 WBC
PDW BLD-RTO: 12.4 % (ref 11.8–14.4)
PLATELET # BLD: 484 K/UL (ref 138–453)
PMV BLD AUTO: 9.7 FL (ref 8.1–13.5)
POTASSIUM SERPL-SCNC: 3.5 MMOL/L (ref 3.7–5.3)
RBC # BLD: 5.64 M/UL (ref 4.21–5.77)
SEG NEUTROPHILS: 62 % (ref 36–65)
SEGMENTED NEUTROPHILS ABSOLUTE COUNT: 6.73 K/UL (ref 1.5–8.1)
SODIUM BLD-SCNC: 138 MMOL/L (ref 135–144)
TOTAL PROTEIN: 8.1 G/DL (ref 6.4–8.3)
WBC # BLD: 10.7 K/UL (ref 3.5–11.3)

## 2022-07-12 PROCEDURE — 99285 EMERGENCY DEPT VISIT HI MDM: CPT

## 2022-07-12 PROCEDURE — 74177 CT ABD & PELVIS W/CONTRAST: CPT

## 2022-07-12 PROCEDURE — 80053 COMPREHEN METABOLIC PANEL: CPT

## 2022-07-12 PROCEDURE — 6360000004 HC RX CONTRAST MEDICATION: Performed by: STUDENT IN AN ORGANIZED HEALTH CARE EDUCATION/TRAINING PROGRAM

## 2022-07-12 PROCEDURE — 85025 COMPLETE CBC W/AUTO DIFF WBC: CPT

## 2022-07-12 PROCEDURE — 83690 ASSAY OF LIPASE: CPT

## 2022-07-12 RX ORDER — ACYCLOVIR 800 MG/1
400 TABLET ORAL 3 TIMES DAILY
Qty: 15 TABLET | Refills: 0 | Status: SHIPPED | OUTPATIENT
Start: 2022-07-12 | End: 2022-08-25 | Stop reason: SDUPTHER

## 2022-07-12 RX ADMIN — IOHEXOL 50 ML: 240 INJECTION, SOLUTION INTRATHECAL; INTRAVASCULAR; INTRAVENOUS; ORAL at 01:49

## 2022-07-12 RX ADMIN — IOPAMIDOL 75 ML: 755 INJECTION, SOLUTION INTRAVENOUS at 03:00

## 2022-07-12 ASSESSMENT — ENCOUNTER SYMPTOMS
CONSTIPATION: 1
VOMITING: 0
NAUSEA: 0
SHORTNESS OF BREATH: 0
DIARRHEA: 0
ABDOMINAL PAIN: 1
BLOOD IN STOOL: 0

## 2022-07-12 ASSESSMENT — PAIN SCALES - GENERAL: PAINLEVEL_OUTOF10: 10

## 2022-07-12 ASSESSMENT — PAIN - FUNCTIONAL ASSESSMENT: PAIN_FUNCTIONAL_ASSESSMENT: 0-10

## 2022-07-12 NOTE — ED PROVIDER NOTES
[facility]  ED     Emergency Department     Faculty Attestation    I performed a history and physical examination of the patient and discussed management with the resident. I reviewed the residents note and agree with the documented findings and plan of care. Any areas of disagreement are noted on the chart. I was personally present for the key portions of any procedures. I have documented in the chart those procedures where I was not present during the key portions. I have reviewed the emergency nurses triage note. I agree with the chief complaint, past medical history, past surgical history, allergies, medications, social and family history as documented unless otherwise noted below. For Physician Assistant/ Nurse Practitioner cases/documentation I have personally evaluated this patient and have completed at least one if not all key elements of the E/M (history, physical exam, and MDM). Additional findings are as noted. Patient with recent admission outside hospital for diverticulitis presents with increased pain. Patient says he left AGAINST MEDICAL ADVICE from the outside hospital a little early. He says he has been taking his oral antibiotics. He says that he had been eating and drinking but not having normal bowel movements. He says that today he started to get increasing pain. He says he did have some gas which did relieve some of the pain but does still continue to have some. He denies any nausea or vomiting. He denies any fever but says he has had occasional chills. On exam, patient is resting comfortably in the bed. Lungs clear to auscultation bilaterally heart sounds are normal.  Abdomen is soft and nontender. No rebound or guarding is present. Will get CT scan of the abdomen with oral contrast.  Will check labs and reassess.       Margarita Sam MD  Attending Emergency  Physician              Moises Winston MD  07/12/22 2852

## 2022-07-12 NOTE — ED PROVIDER NOTES
[facility]  ED  Emergency Department Encounter  Emergency Medicine Resident     Pt Name: Parvin Rees  F:[id number]  Maryamgfwilfredo 1990  Date of evaluation: 7/12/22  PCP:  No primary care provider on file. CHIEF COMPLAINT       Chief Complaint   Patient presents with    Diverticulitis    Abdominal Pain     HISTORY OF PRESENT ILLNESS  (Location/Symptom, Timing/Onset, Context/Setting, Quality, Duration, ModifyingFactors, Severity.)      Parvin Rees is a 32 y.o. male presents for evaluation of abdominal pain. Patient had recent admission at [facility] 6 days ago for perforated diverticulitis that was managed conservatively with IV antibiotics. Patient left 4 days ago and has been taking p.o. Cipro/metronidazole as prescribed. Was feeling better until today when he developed returning LLQ abdominal pain. Patient reports that he has been constipated for the past 2 weeks with only small bowel movements, most recently 2 days ago. Patient does continue to pass gas. No fever, chest pain, shortness of breath abdominal pain, nausea, diarrhea, urinary symptoms, blood in stool. No previous abdominal surgeries. PAST MEDICAL / SURGICAL / SOCIAL /FAMILY HISTORY      has no past medical history on file. No other pertinent PMH on review with patient/guardian. has no past surgical history on file. No other pertinent PSH on review with patient/guardian.   Social History     Socioeconomic History    Marital status: Single     Spouse name: Not on file    Number of children: Not on file    Years of education: Not on file    Highest education level: Not on file   Occupational History    Not on file   Tobacco Use    Smoking status: Never Smoker    Smokeless tobacco: Never Used   Vaping Use    Vaping Use: Never used   Substance and Sexual Activity    Alcohol use: Yes     Comment: rarely    Drug use: Never    Sexual activity: Not on file   Other Topics Concern    Not on file   Social History Narrative    Not on file     Social Determinants of Health     Financial Resource Strain:     Difficulty of Paying Living Expenses: Not on file   Food Insecurity:     Worried About Running Out of Food in the Last Year: Not on file    Katharine of Food in the Last Year: Not on file   Transportation Needs:     Lack of Transportation (Medical): Not on file    Lack of Transportation (Non-Medical): Not on file   Physical Activity:     Days of Exercise per Week: Not on file    Minutes of Exercise per Session: Not on file   Stress:     Feeling of Stress : Not on file   Social Connections:     Frequency of Communication with Friends and Family: Not on file    Frequency of Social Gatherings with Friends and Family: Not on file    Attends Zoroastrianism Services: Not on file    Active Member of [address] [place] [address] Atticous or Organizations: Not on file    Attends Club or Organization Meetings: Not on file    Marital Status: Not on file   Intimate Partner Violence:     Fear of Current or Ex-Partner: Not on file    Emotionally Abused: Not on file    Physically Abused: Not on file    Sexually Abused: Not on file   Housing Stability:     Unable to Pay for Housing in the Last Year: Not on file    Number of [place] in the Last Year: Not on file    Unstable Housing in the Last Year: Not on file       I counseled the patient against using tobacco products. No family history on file. No other pertinent FamHx on review with patient/guardian. Allergies:  Patient has no known allergies. Home Medications:  Prior to Admission medications    Medication Sig Start Date End Date Taking?  Authorizing Provider   acyclovir (ZOVIRAX) 800 MG tablet Take 0.5 tablets by mouth 3 times daily for 10 days 7/12/22 7/22/22 Yes Jennifer Gonzalez,    ciprofloxacin (CIPRO) 500 MG tablet Take 1 tablet by mouth 2 times daily for 14 days 7/8/22 7/22/22  Seth Valentine MD   metroNIDAZOLE (FLAGYL) 500 MG tablet Take 1 tablet by mouth 3 times daily for 14 days 7/8/22 7/22/22  Jaya Eugene MD   ondansetron [facility] 4 MG tablet Take every six hours as needed 7/8/22   Jaya Eugene MD       REVIEW OF SYSTEMS    (2-9 systems for level 4, 10 ormore for level 5)      Review of Systems   Constitutional: Negative for fever. Eyes: Negative for visual disturbance. Respiratory: Negative for shortness of breath. Cardiovascular: Negative for chest pain. Gastrointestinal: Positive for abdominal pain and constipation. Negative for blood in stool, diarrhea, nausea and vomiting. Genitourinary: Negative for dysuria, hematuria, testicular pain and urgency. Skin: Negative for rash. Allergic/Immunologic: Negative for immunocompromised state. Neurological: Negative for headaches. Hematological: Does not bruise/bleed easily. PHYSICAL EXAM   (up to 7 for level 4, 8 or more for level 5)      INITIAL VITALS:   /87   Pulse 77   Temp 97.7 °F (36.5 °C)   Resp 20   Ht 5' 10\" (1.778 m)   Wt 200 lb (90.7 kg)   SpO2 96%   BMI 28.70 kg/m²     Physical Exam  Constitutional:       General: He is not in acute distress. Appearance: Normal appearance. He is not ill-appearing, toxic-appearing or diaphoretic. HENT:      Head: Normocephalic and atraumatic. Right Ear: External ear normal.      Left Ear: External ear normal.   Eyes:      General:         Right eye: No discharge. Left eye: No discharge. Cardiovascular:      Rate and Rhythm: Normal rate and regular rhythm. Pulses: Normal pulses. Heart sounds: No murmur heard. Pulmonary:      Effort: Pulmonary effort is normal. No respiratory distress. Breath sounds: Normal breath sounds. No wheezing, rhonchi or rales. Abdominal:      Palpations: Abdomen is soft. Tenderness: There is no abdominal tenderness. There is no right CVA tenderness, left CVA tenderness, guarding or rebound. Skin:     Capillary Refill: Capillary refill takes less than 2 seconds. Neurological:      General: No focal deficit present. Mental Status: He is alert.        DIFFERENTIAL  DIAGNOSIS     PLAN (LABS / IMAGING / EKG):  Orders Placed This Encounter   Procedures    CT ABDOMEN PELVIS W IV CONTRAST Additional Contrast? Oral    CBC with Auto Differential    Comprehensive Metabolic Panel    Lipase    Inpatient consult to General Surgery     MEDICATIONS ORDERED:  Orders Placed This Encounter   Medications    iohexol (OMNIPAQUE 240) injection 50 mL    iopamidol (ISOVUE-370) 76 % injection 75 mL    acyclovir (ZOVIRAX) 800 MG tablet     Sig: Take 0.5 tablets by mouth 3 times daily for 10 days     Dispense:  15 tablet     Refill:  0     DIAGNOSTIC RESULTS / EMERGENCY DEPARTMENT COURSE / MDM     LABS:  Results for orders placed or performed during the hospital encounter of 07/12/22   CBC with Auto Differential   Result Value Ref Range    WBC 10.7 3.5 - 11.3 k/uL    RBC 5.64 4.21 - 5.77 m/uL    Hemoglobin 16.6 13.0 - 17.0 g/dL    Hematocrit 49.5 40.7 - 50.3 %    MCV 87.8 82.6 - 102.9 fL    MCH 29.4 25.2 - 33.5 pg    MCHC 33.5 28.4 - 34.8 g/dL    RDW 12.4 11.8 - 14.4 %    Platelets 477 (H) 019 - 453 k/uL    MPV 9.7 8.1 - 13.5 fL    NRBC Automated 0.0 0.0 per 100 WBC    Seg Neutrophils 62 36 - 65 %    Lymphocytes 21 (L) 24 - 43 %    Monocytes 13 (H) 3 - 12 %    Eosinophils % 2 1 - 4 %    Basophils 1 0 - 2 %    Immature Granulocytes 1 (H) 0 %    Segs Absolute 6.73 1.50 - 8.10 k/uL    Absolute Lymph # 2.27 1.10 - 3.70 k/uL    Absolute Mono # 1.34 (H) 0.10 - 1.20 k/uL    Absolute Eos # 0.18 0.00 - 0.44 k/uL    Basophils Absolute 0.10 0.00 - 0.20 k/uL    Absolute Immature Granulocyte 0.06 0.00 - 0.30 k/uL   Comprehensive Metabolic Panel   Result Value Ref Range    Glucose 107 (H) 70 - 99 mg/dL    BUN 8 6 - 20 mg/dL    CREATININE 0.98 0.70 - 1.20 mg/dL    Calcium 9.7 8.6 - 10.4 mg/dL    Sodium 138 135 - 144 mmol/L    Potassium 3.5 (L) 3.7 - 5.3 mmol/L    Chloride 98 98 - 107 mmol/L    CO2 26 20 - 31 mmol/L    Anion Gap 14 9 - 17 mmol/L    Alkaline Phosphatase 66 40 - 129 U/L    ALT 30 5 - 41 U/L    AST 25 <40 U/L    Total Bilirubin 0.44 0.3 - 1.2 mg/dL    Total Protein 8.1 6.4 - 8.3 g/dL    Albumin 4.6 3.5 - 5.2 g/dL    Albumin/Globulin Ratio 1.3 1.0 - 2.5    GFR Non-African American >60 >60 mL/min    GFR African American >60 >60 mL/min    GFR Comment         Lipase   Result Value Ref Range    Lipase 28 13 - 60 U/L       IMPRESSION/MDM/ED COURSE:  32 y.o. male presented with history of sigmoid colon perforation due to diverticulitis presents for evaluation of abdominal pain. Patient with admission to [facility] 7/6/2022 but left AMA. CT 7/6 shows perforated diverticulitis with contained fluid Patient afebrile vitals WNL. On exam patient resting only no acute distress, nontoxic-appearing. Heart RRR, lungs clear. Abdomen soft nontender. No guarding or rebound. Patient states that pain is intermittent. Will repeat lab work and CT given patient history.       ED Course as of 07/12/22 0432   Tue Jul 12, 2022   0320 CBC with Auto Differential(!):    WBC 10.7   RBC 5.64   Hemoglobin Quant 16.6   Hematocrit 49.5   MCV 87.8   MCH 29.4   MCHC 33.5   RDW 12.4   Platelet Count 520(!)   MPV 9.7   NRBC Automated 0.0   Seg Neutrophils 62   Lymphocytes 21(!)   Monocytes 13(!)   Eosinophils % 2   Basophils 1   Immature Granulocytes 1(!)   Segs Absolute 6.73   Absolute Lymph # 2.27   Absolute Mono # 1.34(!)   Absolute Eos # 0.18   Basophils Absolute 0.10   Absolute Immature Granulocyte 0.06 [AF]   0320 Comprehensive Metabolic Panel(!):    GLUCOSE, FASTING,(!)   BUN,BUNPL 8   Creatinine 0.98   CALCIUM, SERUM, 253848 9.7   Sodium 138   Potassium 3.5(!)   Chloride 98   CO2 26   Anion Gap 14   Alk Phos 66   ALT 30   AST 25   Bilirubin 0.44   Total Protein 8.1   Albumin 4.6   ALBUMIN/GLOBULIN RATIO 1.3   GFR Non- >60   GFR  >60   GFR Comment      [AF]   0320 Lipase:    Lipase 28 [AF]   0325 IMPRESSION:  1. Contained perforated diverticulitis, with improvement of the area of  phlegmon/fluid collection seen on the previous examination. Mild persistent  inflammatory changes are identified. 2. No other acute process. [AF]   [id number] Will consult surgery. [AF]   [id number] Patient seen and evaluated by surgery who recommend continuing current outpatient antibiotic regiment. They recommend follow-up with GI in 4 to 6 weeks for outpatient colonoscopy. We will also provide patient with referral to PCP. Acyclovir for HSV. I discussed signs and symptoms that would require reevaluation in the ED. The patient expressed understanding and agreement with plan. All questions answered. [AF]      ED Course User Index  [AF] Jeremías Georges, DO         RADIOLOGY:  CT ABDOMEN PELVIS W IV CONTRAST Additional Contrast? Oral   Final Result   1. Contained perforated diverticulitis, with improvement of the area of   phlegmon/fluid collection seen on the previous examination. Mild persistent   inflammatory changes are identified. 2. No other acute process. EKG  None    All EKG's are interpreted by the Emergency Department Physician who either signs or Co-signs this chart in the absence of a cardiologist.    PROCEDURES:  None    CONSULTS:  IP CONSULT TO GENERAL SURGERY    FINAL IMPRESSION      1. Perforated diverticulum    2.  HSV (herpes simplex virus) infection          DISPOSITION / PLAN     DISPOSITION Decision To Discharge 07/12/2022 04:28:26 AM      PATIENT REFERREDTO:  [address] [facility] [address] [place] [address] [phone]  Schedule an appointment as soon as possible for a visit       [address] [facility]  Internal Med  [address]:  New Prescriptions    ACYCLOVIR (ZOVIRAX) 800 MG TABLET    Take 0.5 tablets by mouth 3 times daily for 10 days       Shaq Cruz DO  PGY 3  Resident Physician Emergency Medicine  07/12/22 4:32 AM    (Please note that portions of this note were completed with a voice recognition program.Efforts were made to edit the dictations but occasionally words are mis-transcribed.)       Altaf Bravo DO  Resident  07/12/22 [id number]

## 2022-07-12 NOTE — CONSULTS
General Surgery  Consult    PATIENT NAME: Shay Purvis  AGE: 32 y.o. MEDICAL RECORD NO. [id number]  DATE: 7/12/2022  SURGEON: Milka Causey  PRIMARY CARE PHYSICIAN: No primary care provider on file. Patient evaluated at the request of  Dr. Pricilla Sims  Reason for evaluation: perforated diverticulitis    Patient information was obtained from patient. History/Exam limitations: none. IMPRESSION:     Patient Active Problem List   Diagnosis    Perforation of sigmoid colon due to diverticulitis         PLAN:   Pt's CT scan appears improved from last week, and clinical exam is benign. No leukocytosis. No acute surgical intervention required at this time. Continue oral antibiotics (Cipro/Flagyl) at this time as patient has not had worsening clinical status. Ok to discharge home with caveat that patient should return if he experiences fever, vomiting, worsening symptoms. It is important patient finishes out his oral antibiotics for 14 days from previous prescription. Pt will need to be established with primary care physician  Pt will require colonoscopy in 6-8 weeks once acute process is resolved with either us, Dr. Rula Eaton or Gastroenterology. HISTORY:   History of Chief Complaint:   Parvin Rees is a 32 y.o. male who presents pain from gas earlier today and was unable to pass gas. He recently left [address] [place] [address] with complicated diverticulitis with abscess. IR had been consulted at the time but the abscess was too deep in his pelvis to access. He was put on IV antibiotics for a few days. He left [place] on 7/8/2022 with Cipro/Flagyl and has been taking them. He has 11 more days of oral antibiotics. However, he had increased pain today with constipation and was unable to pass gas. Pt reports chronic constipation issues and explains that he once ate sunflower seeds and had hematochezia but that resolved. No fevers, chills, nausea, vomiting. No personal or family history of Crohns, UC, or food intolerance.  No major medical rales or rhonchi. CARDIOVASCULAR: Heart sounds are normal.  Regular rate and rhythm without murmur, gallop or rub. ABDOMEN: soft, nontender, nondistended, audibly passing gas, Non-peritoneal  NEUROLOGIC: CN II-XII are grossly intact. There are no focalizing motor or sensory deficits  EXTREMITIES: no cyanosis, clubbing or edema    LABS:     Recent Labs     07/12/22  0145   WBC 10.7   HGB 16.6   HCT 49.5   *      K 3.5*   CL 98   CO2 26   BUN 8   CREATININE 0.98   CALCIUM 9.7   AST 25   ALT 30   BILITOT 0.44     Recent Labs     07/12/22  0145   ALKPHOS 66   ALT 30   AST 25   BILITOT 0.44   LABALBU 4.6   LIPASE 28     CBC with Differential:    Lab Results   Component Value Date/Time    WBC 10.7 07/12/2022 01:45 AM    RBC 5.64 07/12/2022 01:45 AM    HGB 16.6 07/12/2022 01:45 AM    HCT 49.5 07/12/2022 01:45 AM     07/12/2022 01:45 AM    MCV 87.8 07/12/2022 01:45 AM    MCH 29.4 07/12/2022 01:45 AM    MCHC 33.5 07/12/2022 01:45 AM    RDW 12.4 07/12/2022 01:45 AM    LYMPHOPCT 21 07/12/2022 01:45 AM    MONOPCT 13 07/12/2022 01:45 AM    BASOPCT 1 07/12/2022 01:45 AM    MONOSABS 1.34 07/12/2022 01:45 AM    LYMPHSABS 2.27 07/12/2022 01:45 AM    EOSABS 0.18 07/12/2022 01:45 AM    BASOSABS 0.10 07/12/2022 01:45 AM     BMP:    Lab Results   Component Value Date/Time     07/12/2022 01:45 AM    K 3.5 07/12/2022 01:45 AM    CL 98 07/12/2022 01:45 AM    CO2 26 07/12/2022 01:45 AM    BUN 8 07/12/2022 01:45 AM    LABALBU 4.6 07/12/2022 01:45 AM    CREATININE 0.98 07/12/2022 01:45 AM    CALCIUM 9.7 07/12/2022 01:45 AM    GFRAA >60 07/12/2022 01:45 AM    LABGLOM >60 07/12/2022 01:45 AM    GLUCOSE 107 07/12/2022 01:45 AM       RADIOLOGY:     CT ABDOMEN PELVIS W IV CONTRAST Additional Contrast? Oral   Final Result   1. Contained perforated diverticulitis, with improvement of the area of   phlegmon/fluid collection seen on the previous examination. Mild persistent   inflammatory changes are identified.    2. No other acute process. Gordo Moran, DO  7/12/2022, 3:48 AM            Trauma Attending Arlin Caba      I have reviewed the above [facility] note(s) and confirmed the key elements of the medical history and physical exam. I have seen and examined the pt. I have discussed the findings, established the care plan and recommendations with Resident.         Judge Claros,   7/14/2022  8:52 PM

## 2022-07-29 ENCOUNTER — HOSPITAL ENCOUNTER (OUTPATIENT)
Age: 32
Discharge: HOME | End: 2022-07-29
Payer: MEDICAID

## 2022-07-29 DIAGNOSIS — Z76.89 ENCOUNTER TO ESTABLISH CARE WITH NEW DOCTOR: ICD-10-CM

## 2022-07-29 DIAGNOSIS — Z13.220 ENCOUNTER FOR SCREENING FOR LIPID DISORDER: ICD-10-CM

## 2022-07-29 DIAGNOSIS — E55.9 VITAMIN D DEFICIENCY: ICD-10-CM

## 2022-07-29 DIAGNOSIS — Z11.4 ENCOUNTER FOR SCREENING FOR HIV: ICD-10-CM

## 2022-07-29 DIAGNOSIS — Z11.59 ENCOUNTER FOR HEPATITIS C SCREENING TEST FOR LOW RISK PATIENT: ICD-10-CM

## 2022-07-29 DIAGNOSIS — Z01.89 ENCOUNTER FOR ROUTINE LABORATORY TESTING: ICD-10-CM

## 2022-07-29 LAB
CHOLESTEROL/HDL RATIO: 4
CHOLESTEROL: 143 MG/DL
HDLC SERPL-MCNC: 36 MG/DL
HIV AG/AB: NONREACTIVE
LDL CHOLESTEROL: 71 MG/DL (ref 0–130)
TRIGL SERPL-MCNC: 179 MG/DL
VITAMIN D 25-HYDROXY: 33.6 NG/ML

## 2022-07-29 PROCEDURE — 87389 HIV-1 AG W/HIV-1&-2 AB AG IA: CPT

## 2022-07-29 PROCEDURE — 82306 VITAMIN D 25 HYDROXY: CPT

## 2022-07-29 PROCEDURE — 86803 HEPATITIS C AB TEST: CPT

## 2022-07-29 PROCEDURE — 36415 COLL VENOUS BLD VENIPUNCTURE: CPT

## 2022-07-29 PROCEDURE — 80061 LIPID PANEL: CPT

## 2022-07-30 LAB — HEPATITIS C ANTIBODY: NONREACTIVE

## 2022-09-13 NOTE — H&P (VIEW-ONLY)
HISTORY and [facility]       NAME:  Mignon Castro  MRN: [id number]   YOB: 1990   Date: 9/16/2022   Age: 32 y.o. Gender: male     COMPLAINT AND PRESENT HISTORY:   Mignon Castro is 32 y.o.,  male, presents for pre-anesthesia/admission testing for COLONOSCOPY DIAGNOSTIC ON 9/27/22, AND OPEN BOWEL RESECTION SIGMOID COLECTOMY, URETERAL CATHETER INSERTION ON 9/30/22 PER DR Norberto Cleaning    Primary dx: PERFORATION OF SIGMOID COLON DUE TO DIVERTICULITIS    HPI:  Mignon Castro is 32 y.o.,  male, will have open bowel resection sigmoid colectomy, with ureteral cathter insertion on 9/30/22. Pt was recently in ED  twice on 7/6/22 and 7/12/22 due to diverticulitis and HSV  infection. Pt had CT scan done which showed perforated diverticulitis, with improvement of the area of phlegmon/fluid collection seen on the previous examination and  Mild persistent inflammatory changes are identified. Pt states he has been following a strict diet in order to control his symptoms of diverticulitis, he avoiding nuts, popcorn, and seeds. He will have Diagnostic Colonoscopy on 9/27/2022 per Dr Ramo Obrien. Pt has No Prior Colonoscopy was done before. Patient denies any  FH of Colon Cancer. Patient reports no changes in bowel habits. No constipation or diarrhea . No GI /Rectal bleeding, experiencing red/ black/ BRBPR stools. Pt states he still has some intermittent mild mid abdominal discomfort  pain as pressure , pain rated 1-2 /10  on the 0-10 scale. With abdominal bloating some times, no nausea or vomiting, no weight loss. Patient denies any Dysphagia. Pt has hx of GERD   Barker's Esophagus. Pt is on a PPI./ Antacid, that is helping to control symptoms. RECENT IMAGING R/T HPI   CT ABDOMEN PELVIS W IV CONTRAST Additional Contrast? Oral [[id number]]  FINDINGS:   Lower Chest: Lung bases are clear. No pericardial or pleural effusion. No   distal esophageal thickening. Organs:  No enhancing or hypodense mass identified in the liver or spleen. Focal fat along the falciform ligament. The gallbladder, adrenal glands and   pancreas appear unremarkable. No renal calculi, ureteral calculi or   hydroureteronephrosis. GI/Bowel: Normal appearing appendix. No ileus or obstruction. Pericolonic   inflammatory changes are seen related to perforated diverticulitis as   demonstrated on the previous exam.  The area of phlegmon and early abscess   seen on the previous examination has improved significantly, with this region   now containing a small amount of air though without significant residual   fluid collection. Pelvis: No pelvic mass. No bladder wall thickening. No bulky pelvic   lymphadenopathy. Peritoneum/Retroperitoneum: No retroperitoneal or mesenteric lymphadenopathy. No bowel herniation. Small fat containing umbilical hernia. Bones/Soft Tissues: No acute subcutaneous soft tissue abnormality. No acute   osseous abnormality. No osseous destructive process. Impression   1. Contained perforated diverticulitis, with improvement of the area of   phlegmon/fluid collection seen on the previous examination. Mild persistent   inflammatory changes are identified. 2. No other acute process. Activity level:  Functional Capacity per patient:   1. Patient is able to walk 2 city blocks on level ground without SOB. 2. Patient is able to climb 2 flights of stairs without SOB. Denies hx of MRSA infection. Denies hx of blood clots. Denies hx of any personal or family hx of complications w/anesthesia.    PAST MEDICAL HISTORY     Past Medical History:   Diagnosis Date    Diverticulitis     HSV (herpes simplex virus) infection        SURGICAL HISTORY       Past Surgical History:   Procedure Laterality Date    WISDOM TOOTH EXTRACTION         SOCIAL HISTORY       Social History     Socioeconomic History    Marital status: Single     Spouse name: None    Number of children: None    Years of education: None    Highest education level: None   Tobacco Use    Smoking status: Never    Smokeless tobacco: Never   Vaping Use    Vaping Use: Some days    Substances: CBD   Substance and Sexual Activity    Alcohol use: Never    Drug use: Never     Social Determinants of Health     Financial Resource Strain: Low Risk     Difficulty of Paying Living Expenses: Not hard at all   Food Insecurity: Food Insecurity Present    Worried About [address] [employer] in the Last Year: Sometimes true    Ran Out of Food in the Last Year: Sometimes true       REVIEW OF SYSTEMS      Allergies   Allergen Reactions    Adhesive Tape Rash     Tegaderm- paper tape is ok       Current Outpatient Medications on File Prior to Encounter   Medication Sig Dispense Refill    valACYclovir (VALTREX) 1 g tablet Take 1,000 mg by mouth as needed Not currently taking 9/16/22       No current facility-administered medications on file prior to encounter. Review of Systems   Constitutional:  Negative for activity change, appetite change and fatigue. HENT: Negative. Eyes:  Positive for visual disturbance. Eye glasses    Respiratory: Negative. Negative for apnea, cough, chest tightness, shortness of breath, wheezing and stridor. Cardiovascular: Negative. Negative for chest pain, palpitations and leg swelling. Gastrointestinal:  Negative for abdominal distention, abdominal pain, anal bleeding, blood in stool, constipation, nausea and vomiting. Genitourinary: Negative. Negative for difficulty urinating, frequency, hematuria and urgency. Musculoskeletal:  Positive for back pain. Skin:         Pt has hx of HSV. He says he recently completed a course of medication for the HSV infection and denies any concerns at this time. Neurological: Negative. Hematological: Negative. Psychiatric/Behavioral: Negative. Negative for sleep disturbance.       GENERAL PHYSICAL EXAM     Vitals: BP (!) 145/96 Pulse 80   Temp 97.4 °F (36.3 °C) (Infrared)   Resp 16   Ht 5' 10\" (1.778 m)   Wt 190 lb (86.2 kg)   SpO2 100%   BMI 27.26 kg/m²               Physical Exam  Constitutional:       Appearance: Normal appearance. He is normal weight. HENT:      Head: Normocephalic. Right Ear: External ear normal.      Left Ear: External ear normal.      Nose: Nose normal.      Mouth/Throat:      Mouth: Mucous membranes are moist.   Eyes:      General:         Right eye: No discharge. Left eye: No discharge. Cardiovascular:      Rate and Rhythm: Normal rate and regular rhythm. Pulses: Normal pulses. Radial pulses are 2+ on the right side and 2+ on the left side. Dorsalis pedis pulses are 2+ on the right side and 2+ on the left side. Posterior tibial pulses are 2+ on the right side and 2+ on the left side. Heart sounds: Normal heart sounds. No murmur heard. No gallop. Pulmonary:      Effort: Pulmonary effort is normal.      Breath sounds: Normal breath sounds. No wheezing or rales. Abdominal:      General: Bowel sounds are normal. There is no distension. Palpations: Abdomen is soft. There is no mass. Tenderness: There is no abdominal tenderness. Musculoskeletal:         General: No swelling or tenderness. Normal range of motion. Cervical back: Normal range of motion and neck supple. Right lower leg: No edema. Left lower leg: No edema. Skin:     General: Skin is warm and dry. Findings: No bruising, erythema or lesion. Neurological:      General: No focal deficit present. Mental Status: He is alert and oriented to person, place, and time. Motor: No weakness.       Gait: Gait normal.   Psychiatric:         Mood and Affect: Mood normal.         Behavior: Behavior normal.       LAB REVIEW     Lab Results   Component Value Date    WBC 8.1 09/16/2022    HGB 15.7 09/16/2022    HCT 46.1 09/16/2022    MCV 88.5 09/16/2022     09/16/2022     Lab Results   Component Value Date/Time     09/16/2022 02:14 PM    K 4.4 09/16/2022 02:14 PM     09/16/2022 02:14 PM    CO2 29 09/16/2022 02:14 PM    BUN 11 09/16/2022 02:14 PM    CREATININE 0.81 09/16/2022 02:14 PM    GLUCOSE 121 09/16/2022 02:14 PM    CALCIUM 9.8 09/16/2022 02:14 PM            SURGERY / PROVISIONAL DIAGNOSES:      COLONOSCOPY DIAGNOSTIC  OPEN BOWEL RESECTION SIGMOID COLECTOMY  URETERAL CATHETER INSERTION  COMPLICATED DIVERTICULITIS  PERFORATION OF SIGMOID COLON DUE TO DIVERTICULITIS      Patient Active Problem List    Diagnosis Date Noted    Perforation of sigmoid colon due to diverticulitis 07/06/2022           CLEARANCE:   Dr. Emile Ramos, anesthesia, was contacted and informed of patient's history and planned surgery. Orders received and no clearance required.       Total time spent on encounter- PAT provider minutes: 31-40 minutes     TIFFANY Thomas - CNP on 9/16/2022 at 2:52 PM

## 2022-09-13 NOTE — H&P
HISTORY and [facility]       NAME:  Roberto Carlos Salazar  MRN: [id number]   YOB: 1990   Date: 9/16/2022   Age: 32 y.o. Gender: male     COMPLAINT AND PRESENT HISTORY:   Roberto Carlos Salazar is 32 y.o.,  male, presents for pre-anesthesia/admission testing for COLONOSCOPY DIAGNOSTIC ON 9/27/22, AND OPEN BOWEL RESECTION SIGMOID COLECTOMY, URETERAL CATHETER INSERTION ON 9/30/22 PER DR Pritesh Lozano    Primary dx: PERFORATION OF SIGMOID COLON DUE TO DIVERTICULITIS    HPI:  Roberto Carlos Salazar is 32 y.o.,  male, will have open bowel resection sigmoid colectomy, with ureteral cathter insertion on 9/30/22. Pt was recently in ED  twice on 7/6/22 and 7/12/22 due to diverticulitis and HSV  infection. Pt had CT scan done which showed perforated diverticulitis, with improvement of the area of phlegmon/fluid collection seen on the previous examination and  Mild persistent inflammatory changes are identified. Pt states he has been following a strict diet in order to control his symptoms of diverticulitis, he avoiding nuts, popcorn, and seeds. He will have Diagnostic Colonoscopy on 9/27/2022 per Dr Bindu Villegas. Pt has No Prior Colonoscopy was done before. Patient denies any  FH of Colon Cancer. Patient reports no changes in bowel habits. No constipation or diarrhea . No GI /Rectal bleeding, experiencing red/ black/ BRBPR stools. Pt states he still has some intermittent mild mid abdominal discomfort  pain as pressure , pain rated 1-2 /10  on the 0-10 scale. With abdominal bloating some times, no nausea or vomiting, no weight loss. Patient denies any Dysphagia. Pt has hx of GERD   Barker's Esophagus. Pt is on a PPI./ Antacid, that is helping to control symptoms. RECENT IMAGING R/T HPI   CT ABDOMEN PELVIS W IV CONTRAST Additional Contrast? Oral [[id number]]  FINDINGS:   Lower Chest: Lung bases are clear. No pericardial or pleural effusion. No   distal esophageal thickening. Organs:  No enhancing or hypodense mass identified in the liver or spleen. Focal fat along the falciform ligament. The gallbladder, adrenal glands and   pancreas appear unremarkable. No renal calculi, ureteral calculi or   hydroureteronephrosis. GI/Bowel: Normal appearing appendix. No ileus or obstruction. Pericolonic   inflammatory changes are seen related to perforated diverticulitis as   demonstrated on the previous exam.  The area of phlegmon and early abscess   seen on the previous examination has improved significantly, with this region   now containing a small amount of air though without significant residual   fluid collection. Pelvis: No pelvic mass. No bladder wall thickening. No bulky pelvic   lymphadenopathy. Peritoneum/Retroperitoneum: No retroperitoneal or mesenteric lymphadenopathy. No bowel herniation. Small fat containing umbilical hernia. Bones/Soft Tissues: No acute subcutaneous soft tissue abnormality. No acute   osseous abnormality. No osseous destructive process. Impression   1. Contained perforated diverticulitis, with improvement of the area of   phlegmon/fluid collection seen on the previous examination. Mild persistent   inflammatory changes are identified. 2. No other acute process. Activity level:  Functional Capacity per patient:   1. Patient is able to walk 2 city blocks on level ground without SOB. 2. Patient is able to climb 2 flights of stairs without SOB. Denies hx of MRSA infection. Denies hx of blood clots. Denies hx of any personal or family hx of complications w/anesthesia.    PAST MEDICAL HISTORY     Past Medical History:   Diagnosis Date    Diverticulitis     HSV (herpes simplex virus) infection        SURGICAL HISTORY       Past Surgical History:   Procedure Laterality Date    WISDOM TOOTH EXTRACTION         SOCIAL HISTORY       Social History     Socioeconomic History    Marital status: Single     Spouse name: None    Number 09/16/2022     Lab Results   Component Value Date/Time     09/16/2022 02:14 PM    K 4.4 09/16/2022 02:14 PM     09/16/2022 02:14 PM    CO2 29 09/16/2022 02:14 PM    BUN 11 09/16/2022 02:14 PM    CREATININE 0.81 09/16/2022 02:14 PM    GLUCOSE 121 09/16/2022 02:14 PM    CALCIUM 9.8 09/16/2022 02:14 PM            SURGERY / PROVISIONAL DIAGNOSES:      COLONOSCOPY DIAGNOSTIC  OPEN BOWEL RESECTION SIGMOID COLECTOMY  URETERAL CATHETER INSERTION  COMPLICATED DIVERTICULITIS  PERFORATION OF SIGMOID COLON DUE TO DIVERTICULITIS      Patient Active Problem List    Diagnosis Date Noted    Perforation of sigmoid colon due to diverticulitis 07/06/2022           CLEARANCE:   Dr. Marianela Mchugh, anesthesia, was contacted and informed of patient's history and planned surgery. Orders received and no clearance required.       Total time spent on encounter- PAT provider minutes: 31-40 minutes     TIFFANY Grover CNP on 9/16/2022 at 2:52 PM

## 2022-09-13 NOTE — H&P (VIEW-ONLY)
HISTORY and [facility]       NAME:  Macarena Pardo  MRN: [id number]   YOB: 1990   Date: 9/16/2022   Age: 32 y.o. Gender: male     COMPLAINT AND PRESENT HISTORY:   Macarena Pardo is 32 y.o.,  male, presents for pre-anesthesia/admission testing for COLONOSCOPY DIAGNOSTIC ON 9/27/22, AND OPEN BOWEL RESECTION SIGMOID COLECTOMY, URETERAL CATHETER INSERTION ON 9/30/22 PER DR Harmony Henry    Primary dx: PERFORATION OF SIGMOID COLON DUE TO DIVERTICULITIS    HPI:  Macarena Pardo is 32 y.o.,  male, will have open bowel resection sigmoid colectomy, with ureteral cathter insertion on 9/30/22. Pt was recently in ED  twice on 7/6/22 and 7/12/22 due to diverticulitis and HSV  infection. Pt had CT scan done which showed perforated diverticulitis, with improvement of the area of phlegmon/fluid collection seen on the previous examination and  Mild persistent inflammatory changes are identified. Pt states he has been following a strict diet in order to control his symptoms of diverticulitis, he avoiding nuts, popcorn, and seeds. He will have Diagnostic Colonoscopy on 9/27/2022 per Dr Bustamante Officer. Pt has No Prior Colonoscopy was done before. Patient denies any  FH of Colon Cancer. Patient reports no changes in bowel habits. No constipation or diarrhea . No GI /Rectal bleeding, experiencing red/ black/ BRBPR stools. Pt states he still has some intermittent mild mid abdominal discomfort  pain as pressure , pain rated 1-2 /10  on the 0-10 scale. With abdominal bloating some times, no nausea or vomiting, no weight loss. Patient denies any Dysphagia. Pt has hx of GERD   Barker's Esophagus. Pt is on a PPI./ Antacid, that is helping to control symptoms. RECENT IMAGING R/T HPI   CT ABDOMEN PELVIS W IV CONTRAST Additional Contrast? Oral [[id number]]  FINDINGS:   Lower Chest: Lung bases are clear. No pericardial or pleural effusion. No   distal esophageal thickening. Organs:  No Pulse 80   Temp 97.4 °F (36.3 °C) (Infrared)   Resp 16   Ht 5' 10\" (1.778 m)   Wt 190 lb (86.2 kg)   SpO2 100%   BMI 27.26 kg/m²               Physical Exam  Constitutional:       Appearance: Normal appearance. He is normal weight. HENT:      Head: Normocephalic. Right Ear: External ear normal.      Left Ear: External ear normal.      Nose: Nose normal.      Mouth/Throat:      Mouth: Mucous membranes are moist.   Eyes:      General:         Right eye: No discharge. Left eye: No discharge. Cardiovascular:      Rate and Rhythm: Normal rate and regular rhythm. Pulses: Normal pulses. Radial pulses are 2+ on the right side and 2+ on the left side. Dorsalis pedis pulses are 2+ on the right side and 2+ on the left side. Posterior tibial pulses are 2+ on the right side and 2+ on the left side. Heart sounds: Normal heart sounds. No murmur heard. No gallop. Pulmonary:      Effort: Pulmonary effort is normal.      Breath sounds: Normal breath sounds. No wheezing or rales. Abdominal:      General: Bowel sounds are normal. There is no distension. Palpations: Abdomen is soft. There is no mass. Tenderness: There is no abdominal tenderness. Musculoskeletal:         General: No swelling or tenderness. Normal range of motion. Cervical back: Normal range of motion and neck supple. Right lower leg: No edema. Left lower leg: No edema. Skin:     General: Skin is warm and dry. Findings: No bruising, erythema or lesion. Neurological:      General: No focal deficit present. Mental Status: He is alert and oriented to person, place, and time. Motor: No weakness.       Gait: Gait normal.   Psychiatric:         Mood and Affect: Mood normal.         Behavior: Behavior normal.       LAB REVIEW     Lab Results   Component Value Date    WBC 8.1 09/16/2022    HGB 15.7 09/16/2022    HCT 46.1 09/16/2022    MCV 88.5 09/16/2022

## 2022-09-16 ENCOUNTER — HOSPITAL ENCOUNTER (OUTPATIENT)
Dept: PREADMISSION TESTING | Age: 32
Discharge: HOME | End: 2022-09-20
Payer: MEDICAID

## 2022-09-16 VITALS
WEIGHT: 190 LBS | HEIGHT: 70 IN | TEMPERATURE: 97.4 F | RESPIRATION RATE: 16 BRPM | BODY MASS INDEX: 27.2 KG/M2 | OXYGEN SATURATION: 100 % | HEART RATE: 80 BPM | DIASTOLIC BLOOD PRESSURE: 96 MMHG | SYSTOLIC BLOOD PRESSURE: 145 MMHG

## 2022-09-16 DIAGNOSIS — Z01.818 PREOP EXAMINATION: ICD-10-CM

## 2022-09-16 LAB
ABO/RH: NORMAL
ABSOLUTE EOS #: 0.2 K/UL (ref 0–0.4)
ABSOLUTE LYMPH #: 2.7 K/UL (ref 1–4.8)
ABSOLUTE MONO #: 0.6 K/UL (ref 0.1–1.3)
ALBUMIN SERPL-MCNC: 4.8 G/DL (ref 3.5–5.2)
ALP BLD-CCNC: 59 U/L (ref 40–129)
ALT SERPL-CCNC: 22 U/L (ref 5–41)
ANION GAP SERPL CALCULATED.3IONS-SCNC: 10 MMOL/L (ref 9–17)
ANTIBODY SCREEN: NEGATIVE
ARM BAND NUMBER: NORMAL
AST SERPL-CCNC: 18 U/L
BASOPHILS # BLD: 1 % (ref 0–2)
BASOPHILS ABSOLUTE: 0.1 K/UL (ref 0–0.2)
BILIRUB SERPL-MCNC: 0.8 MG/DL (ref 0.3–1.2)
BLOOD BANK COMMENT: NORMAL
BUN BLDV-MCNC: 11 MG/DL (ref 6–20)
CALCIUM SERPL-MCNC: 9.8 MG/DL (ref 8.6–10.4)
CHLORIDE BLD-SCNC: 101 MMOL/L (ref 98–107)
CO2: 29 MMOL/L (ref 20–31)
CREAT SERPL-MCNC: 0.81 MG/DL (ref 0.7–1.2)
EOSINOPHILS RELATIVE PERCENT: 3 % (ref 0–4)
EXPIRATION DATE: NORMAL
GFR AFRICAN AMERICAN: >60 ML/MIN
GFR NON-AFRICAN AMERICAN: >60 ML/MIN
GFR SERPL CREATININE-BSD FRML MDRD: ABNORMAL ML/MIN/{1.73_M2}
GLUCOSE BLD-MCNC: 121 MG/DL (ref 70–99)
HCT VFR BLD CALC: 46.1 % (ref 41–53)
HEMOGLOBIN: 15.7 G/DL (ref 13.5–17.5)
LYMPHOCYTES # BLD: 33 % (ref 24–44)
MCH RBC QN AUTO: 30.1 PG (ref 26–34)
MCHC RBC AUTO-ENTMCNC: 34 G/DL (ref 31–37)
MCV RBC AUTO: 88.5 FL (ref 80–100)
MONOCYTES # BLD: 8 % (ref 1–7)
PDW BLD-RTO: 15.1 % (ref 11.5–14.9)
PLATELET # BLD: 413 K/UL (ref 150–450)
PMV BLD AUTO: 7.4 FL (ref 6–12)
POTASSIUM SERPL-SCNC: 4.4 MMOL/L (ref 3.7–5.3)
RBC # BLD: 5.22 M/UL (ref 4.5–5.9)
SEG NEUTROPHILS: 55 % (ref 36–66)
SEGMENTED NEUTROPHILS ABSOLUTE COUNT: 4.6 K/UL (ref 1.3–9.1)
SODIUM BLD-SCNC: 140 MMOL/L (ref 135–144)
TOTAL PROTEIN: 7.7 G/DL (ref 6.4–8.3)
WBC # BLD: 8.1 K/UL (ref 3.5–11)

## 2022-09-16 PROCEDURE — 36415 COLL VENOUS BLD VENIPUNCTURE: CPT

## 2022-09-16 PROCEDURE — 80053 COMPREHEN METABOLIC PANEL: CPT

## 2022-09-16 PROCEDURE — 86901 BLOOD TYPING SEROLOGIC RH(D): CPT

## 2022-09-16 PROCEDURE — 85025 COMPLETE CBC W/AUTO DIFF WBC: CPT

## 2022-09-16 PROCEDURE — 86900 BLOOD TYPING SEROLOGIC ABO: CPT

## 2022-09-16 PROCEDURE — 86850 RBC ANTIBODY SCREEN: CPT

## 2022-09-16 PROCEDURE — APPSS45 APP SPLIT SHARED TIME 31-45 MINUTES: Performed by: NURSE PRACTITIONER

## 2022-09-16 RX ORDER — VALACYCLOVIR HYDROCHLORIDE 1 G/1
1000 TABLET, FILM COATED ORAL PRN
COMMUNITY

## 2022-09-16 ASSESSMENT — ENCOUNTER SYMPTOMS
SHORTNESS OF BREATH: 0
COUGH: 0
CONSTIPATION: 0
BACK PAIN: 1
RESPIRATORY NEGATIVE: 1
ANAL BLEEDING: 0
BLOOD IN STOOL: 0
STRIDOR: 0
NAUSEA: 0
APNEA: 0
CHEST TIGHTNESS: 0
WHEEZING: 0
ABDOMINAL DISTENTION: 0
ABDOMINAL PAIN: 0
VOMITING: 0

## 2022-09-16 ASSESSMENT — PAIN DESCRIPTION - LOCATION: LOCATION: ABDOMEN

## 2022-09-16 ASSESSMENT — PAIN DESCRIPTION - PAIN TYPE: TYPE: ACUTE PAIN

## 2022-09-16 ASSESSMENT — PAIN SCALES - GENERAL: PAINLEVEL_OUTOF10: 1

## 2022-09-16 ASSESSMENT — PAIN DESCRIPTION - ORIENTATION: ORIENTATION: LOWER

## 2022-09-16 ASSESSMENT — PAIN DESCRIPTION - DESCRIPTORS: DESCRIPTORS: ACHING

## 2022-09-16 NOTE — DISCHARGE INSTRUCTIONS
Pre-op Instructions For Patient Being Admitted After Surgery    Medication Instructions:  Please stop herbs and any supplements now (includes vitamins and minerals). Please contact your surgeon and prescribing physician for pre-op instructions for any blood thinners. If you have inhalers/aerosol treatments at home, please use them the morning of your surgery and bring the inhalers with you to the hospital.    Please take the following medications the morning of your surgery with a sip of water:    none    Surgery Instructions:  After midnight before surgery:  Do not eat or drink anything, including water, mints, gum, and hard candy. You may brush your teeth without swallowing. No smoking, chewing tobacco, or street drugs. Please shower or bathe before surgery. If you were given Surgical Scrub Chlorhexidine Gluconate Liquid (CHG), please shower the night before and the morning of your surgery following the detailed instructions you received during your pre-admission visit. Please do not wear any cologne, lotion, powder, deodorant, jewelry, piercings, perfume, makeup, nail polish, hair accessories, or hair spray on the day of surgery. Wear loose comfortable clothing. Leave your valuables at home. Bring a storage case for any glasses/contacts. The Day of Surgery:  Arrive at [address] [place] [address] Surgery Entrance at the time directed by your surgeon and check in at the desk. If you have a living will or healthcare power of , please bring a copy. You will be taken to the pre-op holding area where you will be prepared for surgery. A physical assessment will be performed by a nurse practitioner or house officer. Your IV will be started and you will meet your anesthesiologist.    When you go to surgery, your family will be directed to the surgical waiting room, where the doctor should speak with them after your surgery.     You will be in the recovery room after surgery and taken to your room when you are stable. Please leave your suitcase in the car. Have your family member take it to your room after you are out of recovery. If you use a Bi-PAP or C-PAP machine, please bring it with you and leave it in the car until you are in your room.

## 2022-09-26 ENCOUNTER — ANESTHESIA EVENT (OUTPATIENT)
Dept: ENDOSCOPY | Age: 32
End: 2022-09-26
Payer: MEDICAID

## 2022-09-27 ENCOUNTER — HOSPITAL ENCOUNTER (OUTPATIENT)
Age: 32
Setting detail: OUTPATIENT SURGERY
Discharge: HOME | End: 2022-09-27
Attending: SURGERY | Admitting: SURGERY
Payer: MEDICAID

## 2022-09-27 ENCOUNTER — ANESTHESIA (OUTPATIENT)
Dept: ENDOSCOPY | Age: 32
End: 2022-09-27
Payer: MEDICAID

## 2022-09-27 VITALS
RESPIRATION RATE: 20 BRPM | OXYGEN SATURATION: 100 % | SYSTOLIC BLOOD PRESSURE: 140 MMHG | DIASTOLIC BLOOD PRESSURE: 78 MMHG | BODY MASS INDEX: 27.2 KG/M2 | HEIGHT: 70 IN | TEMPERATURE: 97 F | WEIGHT: 190 LBS | HEART RATE: 80 BPM

## 2022-09-27 PROCEDURE — 7100000000 HC PACU RECOVERY - FIRST 15 MIN: Performed by: SURGERY

## 2022-09-27 PROCEDURE — 7100000001 HC PACU RECOVERY - ADDTL 15 MIN: Performed by: SURGERY

## 2022-09-27 PROCEDURE — 7100000011 HC PHASE II RECOVERY - ADDTL 15 MIN: Performed by: SURGERY

## 2022-09-27 PROCEDURE — 7100000030 HC ASPR PHASE II RECOVERY - FIRST 15 MIN: Performed by: SURGERY

## 2022-09-27 PROCEDURE — 2500000003 HC RX 250 WO HCPCS: Performed by: NURSE ANESTHETIST, CERTIFIED REGISTERED

## 2022-09-27 PROCEDURE — 2709999900 HC NON-CHARGEABLE SUPPLY: Performed by: SURGERY

## 2022-09-27 PROCEDURE — 3609027000 HC COLONOSCOPY: Performed by: SURGERY

## 2022-09-27 PROCEDURE — 7100000031 HC ASPR PHASE II RECOVERY - ADDTL 15 MIN: Performed by: SURGERY

## 2022-09-27 PROCEDURE — 7100000010 HC PHASE II RECOVERY - FIRST 15 MIN: Performed by: SURGERY

## 2022-09-27 PROCEDURE — 3700000001 HC ADD 15 MINUTES (ANESTHESIA): Performed by: SURGERY

## 2022-09-27 PROCEDURE — 6360000002 HC RX W HCPCS: Performed by: NURSE ANESTHETIST, CERTIFIED REGISTERED

## 2022-09-27 PROCEDURE — 2580000003 HC RX 258: Performed by: ANESTHESIOLOGY

## 2022-09-27 PROCEDURE — 3700000000 HC ANESTHESIA ATTENDED CARE: Performed by: SURGERY

## 2022-09-27 RX ORDER — NEOMYCIN SULFATE 500 MG/1
TABLET ORAL ONCE
Status: ON HOLD | COMMUNITY
Start: 2022-09-12 | End: 2022-10-03 | Stop reason: HOSPADM

## 2022-09-27 RX ORDER — HYDRALAZINE HYDROCHLORIDE 20 MG/ML
10 INJECTION INTRAMUSCULAR; INTRAVENOUS
Status: DISCONTINUED | OUTPATIENT
Start: 2022-09-27 | End: 2022-09-27 | Stop reason: HOSPADM

## 2022-09-27 RX ORDER — PROPOFOL 10 MG/ML
INJECTION, EMULSION INTRAVENOUS PRN
Status: DISCONTINUED | OUTPATIENT
Start: 2022-09-27 | End: 2022-09-27 | Stop reason: SDUPTHER

## 2022-09-27 RX ORDER — SODIUM CHLORIDE 0.9 % (FLUSH) 0.9 %
5-40 SYRINGE (ML) INJECTION EVERY 12 HOURS SCHEDULED
Status: DISCONTINUED | OUTPATIENT
Start: 2022-09-27 | End: 2022-09-27 | Stop reason: HOSPADM

## 2022-09-27 RX ORDER — SODIUM CHLORIDE 9 MG/ML
INJECTION, SOLUTION INTRAVENOUS PRN
Status: DISCONTINUED | OUTPATIENT
Start: 2022-09-27 | End: 2022-09-27 | Stop reason: HOSPADM

## 2022-09-27 RX ORDER — SODIUM CHLORIDE 0.9 % (FLUSH) 0.9 %
5-40 SYRINGE (ML) INJECTION PRN
Status: DISCONTINUED | OUTPATIENT
Start: 2022-09-27 | End: 2022-09-27 | Stop reason: HOSPADM

## 2022-09-27 RX ORDER — SODIUM CHLORIDE, SODIUM LACTATE, POTASSIUM CHLORIDE, CALCIUM CHLORIDE 600; 310; 30; 20 MG/100ML; MG/100ML; MG/100ML; MG/100ML
INJECTION, SOLUTION INTRAVENOUS CONTINUOUS
Status: DISCONTINUED | OUTPATIENT
Start: 2022-09-27 | End: 2022-09-27 | Stop reason: HOSPADM

## 2022-09-27 RX ORDER — FENTANYL CITRATE 50 UG/ML
25 INJECTION, SOLUTION INTRAMUSCULAR; INTRAVENOUS EVERY 5 MIN PRN
Status: DISCONTINUED | OUTPATIENT
Start: 2022-09-27 | End: 2022-09-27 | Stop reason: HOSPADM

## 2022-09-27 RX ORDER — LABETALOL HYDROCHLORIDE 5 MG/ML
10 INJECTION, SOLUTION INTRAVENOUS
Status: DISCONTINUED | OUTPATIENT
Start: 2022-09-27 | End: 2022-09-27 | Stop reason: HOSPADM

## 2022-09-27 RX ORDER — LIDOCAINE HYDROCHLORIDE 10 MG/ML
1 INJECTION, SOLUTION EPIDURAL; INFILTRATION; INTRACAUDAL; PERINEURAL
Status: DISCONTINUED | OUTPATIENT
Start: 2022-09-27 | End: 2022-09-27 | Stop reason: HOSPADM

## 2022-09-27 RX ORDER — LIDOCAINE HYDROCHLORIDE 10 MG/ML
INJECTION, SOLUTION EPIDURAL; INFILTRATION; INTRACAUDAL; PERINEURAL PRN
Status: DISCONTINUED | OUTPATIENT
Start: 2022-09-27 | End: 2022-09-27 | Stop reason: SDUPTHER

## 2022-09-27 RX ORDER — METOCLOPRAMIDE HYDROCHLORIDE 5 MG/ML
10 INJECTION INTRAMUSCULAR; INTRAVENOUS
Status: DISCONTINUED | OUTPATIENT
Start: 2022-09-27 | End: 2022-09-27 | Stop reason: HOSPADM

## 2022-09-27 RX ORDER — MEPERIDINE HYDROCHLORIDE 25 MG/ML
12.5 INJECTION INTRAMUSCULAR; INTRAVENOUS; SUBCUTANEOUS EVERY 5 MIN PRN
Status: DISCONTINUED | OUTPATIENT
Start: 2022-09-27 | End: 2022-09-27 | Stop reason: HOSPADM

## 2022-09-27 RX ORDER — DIPHENHYDRAMINE HYDROCHLORIDE 50 MG/ML
12.5 INJECTION INTRAMUSCULAR; INTRAVENOUS
Status: DISCONTINUED | OUTPATIENT
Start: 2022-09-27 | End: 2022-09-27 | Stop reason: HOSPADM

## 2022-09-27 RX ORDER — ONDANSETRON 2 MG/ML
INJECTION INTRAMUSCULAR; INTRAVENOUS PRN
Status: DISCONTINUED | OUTPATIENT
Start: 2022-09-27 | End: 2022-09-27 | Stop reason: SDUPTHER

## 2022-09-27 RX ORDER — ONDANSETRON 2 MG/ML
4 INJECTION INTRAMUSCULAR; INTRAVENOUS
Status: DISCONTINUED | OUTPATIENT
Start: 2022-09-27 | End: 2022-09-27 | Stop reason: HOSPADM

## 2022-09-27 RX ORDER — ERYTHROMYCIN 500 MG/1
TABLET, COATED ORAL ONCE
Status: ON HOLD | COMMUNITY
Start: 2022-09-12 | End: 2022-10-03 | Stop reason: HOSPADM

## 2022-09-27 RX ADMIN — PROPOFOL 50 MG: 10 INJECTION, EMULSION INTRAVENOUS at 14:45

## 2022-09-27 RX ADMIN — LIDOCAINE HYDROCHLORIDE 100 MG: 10 INJECTION, SOLUTION EPIDURAL; INFILTRATION; INTRACAUDAL; PERINEURAL at 14:44

## 2022-09-27 RX ADMIN — SODIUM CHLORIDE, POTASSIUM CHLORIDE, SODIUM LACTATE AND CALCIUM CHLORIDE: 600; 310; 30; 20 INJECTION, SOLUTION INTRAVENOUS at 11:53

## 2022-09-27 RX ADMIN — ONDANSETRON 4 MG: 2 INJECTION INTRAMUSCULAR; INTRAVENOUS at 14:47

## 2022-09-27 RX ADMIN — PROPOFOL 250 MG: 10 INJECTION, EMULSION INTRAVENOUS at 14:44

## 2022-09-27 ASSESSMENT — ENCOUNTER SYMPTOMS
SHORTNESS OF BREATH: 0
STRIDOR: 0

## 2022-09-27 ASSESSMENT — LIFESTYLE VARIABLES: SMOKING_STATUS: 0

## 2022-09-27 ASSESSMENT — PAIN - FUNCTIONAL ASSESSMENT: PAIN_FUNCTIONAL_ASSESSMENT: 0-10

## 2022-09-27 ASSESSMENT — PAIN SCALES - GENERAL: PAINLEVEL_OUTOF10: 0

## 2022-09-27 NOTE — ANESTHESIA POSTPROCEDURE EVALUATION
POST- ANESTHESIA EVALUATION       Pt Name: Cher Mcardle  MRN: [id number]  YOB: 1990  Date of evaluation: 9/27/2022  Time:  4:03 PM      /81   Pulse 77   Temp 98.2 °F (36.8 °C)   Resp 15   Ht 5' 10\" (1.778 m)   Wt 190 lb (86.2 kg)   SpO2 100%   BMI 27.26 kg/m²      Consciousness Level  Awake  Cardiopulmonary Status  Stable  Pain Adequately Treated YES  Nausea / Vomiting  NO  Adequate Hydration  YES  Anesthesia Related Complications NONE      Electronically signed by Alvin Carter MD on 9/27/2022 at 4:03 PM       Department of Anesthesiology  Postprocedure Note    Patient: Cher Mcardle  MRN: [id number]  YOB: 1990  Date of evaluation: 9/27/2022      Procedure Summary     Date: 09/27/22 Room / Location: [place] [address] / [facility]    Anesthesia Start: 7606 Anesthesia Stop: 5059    Procedure: COLONOSCOPY DIAGNOSTIC Diagnosis:       Diverticulitis      (COMPLICATED DIVERTICULITIS)    Surgeons: Pamela Porter MD Responsible Provider: Alvin Carter MD    Anesthesia Type: general ASA Status: 2          Anesthesia Type: No value filed.     Twan Phase I: Twan Score: 5    Twan Phase II:        Anesthesia Post Evaluation

## 2022-09-27 NOTE — OP NOTE
Operative Note      Patient: Crescencio York  YOB: 1990  MRN: [id number]    Date of Procedure: 9/27/2022  PROCEDURE NOTE    DATE OF PROCEDURE: 9/27/2022    SURGEON: Romario Ryan MD    ASSISTANT: None    PREOPERATIVE DIAGNOSIS: History of complicated sigmoid diverticulitis    POSTOPERATIVE DIAGNOSIS: Sigmoid diverticulosis. Fair prep. OPERATION: Total colonoscopy to cecum with intubation of terminal ileum    ANESTHESIA: General    ESTIMATED BLOOD LOSS: None    COMPLICATIONS: None     SPECIMENS:  Was Not Obtained    HISTORY: The patient is a 32y.o. year old male with history of above preop diagnosis. I recommended colonoscopy with possible biopsy or polypectomy and I explained the risk, benefits, expected outcome, and alternatives to the procedure. Risks included but are not limited to bleeding, infection, respiratory distress, hypotension, and perforation of the colon and possibility of missing a lesion. The patient understands and is in agreement. PROCEDURE: The patient was given IV conscious sedation. The patient's SPO2 remained above 90% throughout the procedure. Digital rectal exam was normal.  The colonoscope was inserted through the anus into the rectum and advanced under direct vision to the cecum without difficulty. Terminal ileum was examined for approximately 2 inches. The prep was fair. Findings:  Terminal ileum: normal    Cecum/Ascending colon: normal    Transverse colon: normal    Descending/Sigmoid colon: abnormal: Sigmoid diverticulosis    Rectum/Anus: examined in normal and retroflexed positions and was normal    Withdrawal Time was (minutes): 18        The colon was decompressed. While withdrawing the scope the above findings were verified and the scope was removed. The patient tolerated the procedure and conscious sedation without unusual events. In the recovery room patient was examined and remains hemodynamically stable.   Discharge home when criteria met.    Recommendations/Plan: To proceed with sigmoid colectomy as scheduled.   Discussed with the family  High fiber diet   Precautions to avoid constipation    Electronically signed by Joni Liu MD  on 9/27/2022 at 3:44 PM

## 2022-09-27 NOTE — INTERVAL H&P NOTE
Update History & Physical     The patient's History and Physical of 9/16/2022 was reviewed with the patient. I personally examined the patient, I concur with above findings, there was no change in his health status. Physical exam was completed today and unchanged from source note except:    Alert and oriented x 4. Heart rhythm and rate remains regular and normal, all lung fields CTA as noted per source H&P. Abdomen soft, non-tender. No LE edema. Patient states they have been NPO since before midnight. Medications taken TODAY (w/sip of water): no medications today. Patient denies taking any anti-coagulants, including aspirin currently. No personal or family hx of complications w/anesthesia. Denies personal hx of MRSA. Denies personal hx of blood clots. Denies current CP, palpitations, dizziness, SOB, URI s/s, GI issues, fever/chills. Review current vital signs per RN flow sheet. (Notation: Medications listed are not currently reconciled at the signing of this H&P note, to be reconciled in pre-op per RN)    Pt is here for colonoscopy today due to complicated diverticulitis.      Most recent lab work reviewed:  Lab Results   Component Value Date     09/16/2022    K 4.4 09/16/2022     09/16/2022    CO2 29 09/16/2022    BUN 11 09/16/2022    CREATININE 0.81 09/16/2022    GLUCOSE 121 (H) 09/16/2022    CALCIUM 9.8 09/16/2022    PROT 7.7 09/16/2022    LABALBU 4.8 09/16/2022    BILITOT 0.8 09/16/2022    ALKPHOS 59 09/16/2022    AST 18 09/16/2022    ALT 22 09/16/2022    LABGLOM >60 09/16/2022    GFRAA >60 09/16/2022       Lab Results   Component Value Date    WBC 8.1 09/16/2022    HGB 15.7 09/16/2022    HCT 46.1 09/16/2022    MCV 88.5 09/16/2022     09/16/2022       Electronically signed by TIFFANY Chua CNP on 9/27/2022 at 11:08 AM

## 2022-09-27 NOTE — ANESTHESIA PRE PROCEDURE
Department of Anesthesiology  Preprocedure Note       Name:  Cher Mcardle   Age:  32 y.o.  :  1990                                          MRN:  [id number]         Date:  2022      Surgeon: Lonna Frankel):  Pamela Porter MD    Procedure: Procedure(s):  COLONOSCOPY DIAGNOSTIC    Medications prior to admission:   Prior to Admission medications    Medication Sig Start Date End Date Taking? Authorizing Provider   erythromycin base (E-MYCIN) 500 MG tablet Take by mouth once  Patient not taking: Reported on 2022  Yes Historical Provider, MD   neomycin (MYCIFRADIN) 500 MG tablet once  Patient not taking: Reported on 2022  Yes Historical Provider, MD   valACYclovir (VALTREX) 1 g tablet Take 1,000 mg by mouth as needed Not currently taking 22    Historical Provider, MD       Current medications:    No current facility-administered medications for this encounter. Allergies: Allergies   Allergen Reactions    Adhesive Tape Rash     Tegaderm- paper tape is ok       Problem List:    Patient Active Problem List   Diagnosis Code    Perforation of sigmoid colon due to diverticulitis K57.20    Preop examination Z01.818       Past Medical History:        Diagnosis Date    Diverticulitis     HSV (herpes simplex virus) infection        Past Surgical History:        Procedure Laterality Date    WISDOM TOOTH EXTRACTION         Social History:    Social History     Tobacco Use    Smoking status: Never    Smokeless tobacco: Never   Substance Use Topics    Alcohol use: Never                                Counseling given: Not Answered      Vital Signs (Current): There were no vitals filed for this visit.                                            BP Readings from Last 3 Encounters:   22 (!) 145/96   22 136/78   22 139/87       NPO Status:                                                                                 BMI:   Wt Readings from Last 3 Encounters: 09/16/22 190 lb (86.2 kg)   07/28/22 190 lb 9.6 oz (86.5 kg)   07/12/22 200 lb (90.7 kg)     There is no height or weight on file to calculate BMI.    CBC:   Lab Results   Component Value Date/Time    WBC 8.1 09/16/2022 02:14 PM    RBC 5.22 09/16/2022 02:14 PM    HGB 15.7 09/16/2022 02:14 PM    HCT 46.1 09/16/2022 02:14 PM    MCV 88.5 09/16/2022 02:14 PM    RDW 15.1 09/16/2022 02:14 PM     09/16/2022 02:14 PM     LR    CMP:   Lab Results   Component Value Date/Time     09/16/2022 02:14 PM    K 4.4 09/16/2022 02:14 PM     09/16/2022 02:14 PM    CO2 29 09/16/2022 02:14 PM    BUN 11 09/16/2022 02:14 PM    CREATININE 0.81 09/16/2022 02:14 PM    GFRAA >60 09/16/2022 02:14 PM    LABGLOM >60 09/16/2022 02:14 PM    GLUCOSE 121 09/16/2022 02:14 PM    PROT 7.7 09/16/2022 02:14 PM    CALCIUM 9.8 09/16/2022 02:14 PM    BILITOT 0.8 09/16/2022 02:14 PM    ALKPHOS 59 09/16/2022 02:14 PM    AST 18 09/16/2022 02:14 PM    ALT 22 09/16/2022 02:14 PM       POC Tests: No results for input(s): POCGLU, POCNA, POCK, POCCL, POCBUN, POCHEMO, POCHCT in the last 72 hours.     Coags:   Lab Results   Component Value Date/Time    PROTIME 13.7 07/07/2022 05:09 AM    INR 1.1 07/07/2022 05:09 AM       HCG (If Applicable): No results found for: PREGTESTUR, PREGSERUM, HCG, HCGQUANT     ABGs: No results found for: PHART, PO2ART, WSO4DUJ, DNX2ZOD, BEART, T6PHGMYW     Type & Screen (If Applicable):  No results found for: LABABO, LABRH    Drug/Infectious Status (If Applicable):  Lab Results   Component Value Date/Time    HEPCAB NONREACTIVE 07/29/2022 07:41 AM       COVID-19 Screening (If Applicable): No results found for: COVID19        Anesthesia Evaluation  Patient summary reviewed and Nursing notes reviewed no history of anesthetic complications:   Airway: Mallampati: II  TM distance: >3 FB   Neck ROM: full  Mouth opening: > = 3 FB   Dental: normal exam         Pulmonary:Negative Pulmonary ROS and normal exam  breath sounds clear to auscultation      (-) pneumonia, COPD, asthma, shortness of breath, recent URI, sleep apnea, rhonchi, wheezes, rales, stridor, not a current smoker and no decreased breath sounds          Patient did not smoke on day of surgery. Cardiovascular:  Exercise tolerance: good (>4 METS),       (-) pacemaker, hypertension, valvular problems/murmurs, past MI, CAD, CABG/stent, dysrhythmias,  angina,  CHF, orthopnea, PND,  PARTIDA, murmur, weak pulses,  friction rub, systolic click, carotid bruit,  JVD, peripheral edema, no pulmonary hypertension and no hyperlipidemia      Rhythm: regular  Rate: normal           Beta Blocker:  Not on Beta Blocker         Neuro/Psych:   Negative Neuro/Psych ROS     (-) seizures, neuromuscular disease, TIA, CVA, headaches, psychiatric history and depression/anxiety            GI/Hepatic/Renal: Neg GI/Hepatic/Renal ROS       (-) hiatal hernia, GERD, PUD, hepatitis, liver disease, no renal disease, bowel prep and no morbid obesity       Endo/Other:    (+) no malignancy/cancer. (-) diabetes mellitus, hypothyroidism, hyperthyroidism, blood dyscrasia, arthritis, no electrolyte abnormalities, no malignancy/cancer               Abdominal:             Vascular: negative vascular ROS. - PVD, DVT and PE. Other Findings:           Anesthesia Plan      general     ASA 2       Induction: intravenous. MIPS: Postoperative opioids intended and Prophylactic antiemetics administered. Anesthetic plan and risks discussed with patient. Plan discussed with CRNA.                     Janett Lilly MD   9/27/2022

## 2022-09-29 ENCOUNTER — ANESTHESIA EVENT (OUTPATIENT)
Dept: OPERATING ROOM | Age: 32
DRG: 231 | End: 2022-09-29
Payer: MEDICAID

## 2022-09-30 ENCOUNTER — HOSPITAL ENCOUNTER (INPATIENT)
Age: 32
LOS: 4 days | Discharge: HOME | DRG: 231 | End: 2022-10-04
Attending: SURGERY | Admitting: SURGERY
Payer: MEDICAID

## 2022-09-30 ENCOUNTER — ANESTHESIA (OUTPATIENT)
Dept: OPERATING ROOM | Age: 32
DRG: 231 | End: 2022-09-30
Payer: MEDICAID

## 2022-09-30 DIAGNOSIS — K57.20 PERFORATION OF SIGMOID COLON DUE TO DIVERTICULITIS: ICD-10-CM

## 2022-09-30 LAB
BACTERIA: NORMAL
BILIRUBIN URINE: NEGATIVE
CASTS UA: NORMAL /LPF
COLOR: YELLOW
EPITHELIAL CELLS UA: NORMAL /HPF
GLUCOSE BLD-MCNC: 137 MG/DL (ref 75–110)
GLUCOSE URINE: NEGATIVE
KETONES, URINE: ABNORMAL
LEUKOCYTE ESTERASE, URINE: NEGATIVE
NITRITE, URINE: NEGATIVE
PH UA: 5 (ref 5–8)
PROTEIN UA: NEGATIVE
RBC UA: NORMAL /HPF
SPECIFIC GRAVITY UA: 1.01 (ref 1–1.03)
TURBIDITY: ABNORMAL
URINE HGB: NEGATIVE
UROBILINOGEN, URINE: NORMAL
WBC UA: NORMAL /HPF

## 2022-09-30 PROCEDURE — 2720000010 HC SURG SUPPLY STERILE: Performed by: SURGERY

## 2022-09-30 PROCEDURE — L0450 TLSO FLEX TRUNK/THOR PRE OTS: HCPCS | Performed by: SURGERY

## 2022-09-30 PROCEDURE — 2500000003 HC RX 250 WO HCPCS: Performed by: NURSE ANESTHETIST, CERTIFIED REGISTERED

## 2022-09-30 PROCEDURE — 0TJB8ZZ INSPECTION OF BLADDER, VIA NATURAL OR ARTIFICIAL OPENING ENDOSCOPIC: ICD-10-PCS | Performed by: SURGERY

## 2022-09-30 PROCEDURE — 82947 ASSAY GLUCOSE BLOOD QUANT: CPT

## 2022-09-30 PROCEDURE — 2580000003 HC RX 258: Performed by: SURGERY

## 2022-09-30 PROCEDURE — 7100000000 HC PACU RECOVERY - FIRST 15 MIN: Performed by: SURGERY

## 2022-09-30 PROCEDURE — C1758 CATHETER, URETERAL: HCPCS | Performed by: SURGERY

## 2022-09-30 PROCEDURE — A4216 STERILE WATER/SALINE, 10 ML: HCPCS | Performed by: SURGERY

## 2022-09-30 PROCEDURE — 81001 URINALYSIS AUTO W/SCOPE: CPT

## 2022-09-30 PROCEDURE — 1200000000 HC SEMI PRIVATE

## 2022-09-30 PROCEDURE — 6360000002 HC RX W HCPCS: Performed by: SURGERY

## 2022-09-30 PROCEDURE — 3600000003 HC SURGERY LEVEL 3 BASE: Performed by: SURGERY

## 2022-09-30 PROCEDURE — 6360000002 HC RX W HCPCS: Performed by: NURSE ANESTHETIST, CERTIFIED REGISTERED

## 2022-09-30 PROCEDURE — C1769 GUIDE WIRE: HCPCS | Performed by: SURGERY

## 2022-09-30 PROCEDURE — 7100000001 HC PACU RECOVERY - ADDTL 15 MIN: Performed by: SURGERY

## 2022-09-30 PROCEDURE — 3700000000 HC ANESTHESIA ATTENDED CARE: Performed by: SURGERY

## 2022-09-30 PROCEDURE — 0DTN0ZZ RESECTION OF SIGMOID COLON, OPEN APPROACH: ICD-10-PCS | Performed by: SURGERY

## 2022-09-30 PROCEDURE — 2580000003 HC RX 258: Performed by: ANESTHESIOLOGY

## 2022-09-30 PROCEDURE — 6360000002 HC RX W HCPCS: Performed by: ANESTHESIOLOGY

## 2022-09-30 PROCEDURE — 88305 TISSUE EXAM BY PATHOLOGIST: CPT

## 2022-09-30 PROCEDURE — 2709999900 HC NON-CHARGEABLE SUPPLY: Performed by: SURGERY

## 2022-09-30 PROCEDURE — C1765 ADHESION BARRIER: HCPCS | Performed by: SURGERY

## 2022-09-30 PROCEDURE — 88307 TISSUE EXAM BY PATHOLOGIST: CPT

## 2022-09-30 PROCEDURE — 2500000003 HC RX 250 WO HCPCS: Performed by: SURGERY

## 2022-09-30 PROCEDURE — 3600000013 HC SURGERY LEVEL 3 ADDTL 15MIN: Performed by: SURGERY

## 2022-09-30 PROCEDURE — 3700000001 HC ADD 15 MINUTES (ANESTHESIA): Performed by: SURGERY

## 2022-09-30 PROCEDURE — C9113 INJ PANTOPRAZOLE SODIUM, VIA: HCPCS | Performed by: SURGERY

## 2022-09-30 PROCEDURE — 0DN80ZZ RELEASE SMALL INTESTINE, OPEN APPROACH: ICD-10-PCS | Performed by: SURGERY

## 2022-09-30 DEVICE — BARRIER, ABSORBABLE, ADHESION
Type: IMPLANTABLE DEVICE | Site: ABDOMEN | Status: FUNCTIONAL
Brand: SEPRAFILM®

## 2022-09-30 RX ORDER — SODIUM CHLORIDE 0.9 % (FLUSH) 0.9 %
5-40 SYRINGE (ML) INJECTION PRN
Status: DISCONTINUED | OUTPATIENT
Start: 2022-09-30 | End: 2022-09-30 | Stop reason: HOSPADM

## 2022-09-30 RX ORDER — KETAMINE HCL IN NACL, ISO-OSM 100MG/10ML
SYRINGE (ML) INJECTION PRN
Status: DISCONTINUED | OUTPATIENT
Start: 2022-09-30 | End: 2022-09-30 | Stop reason: SDUPTHER

## 2022-09-30 RX ORDER — SODIUM CHLORIDE 0.9 % (FLUSH) 0.9 %
5-40 SYRINGE (ML) INJECTION EVERY 12 HOURS SCHEDULED
Status: DISCONTINUED | OUTPATIENT
Start: 2022-09-30 | End: 2022-09-30 | Stop reason: HOSPADM

## 2022-09-30 RX ORDER — SODIUM CHLORIDE 9 MG/ML
INJECTION, SOLUTION INTRAVENOUS PRN
Status: DISCONTINUED | OUTPATIENT
Start: 2022-09-30 | End: 2022-09-30 | Stop reason: HOSPADM

## 2022-09-30 RX ORDER — OXYCODONE HYDROCHLORIDE AND ACETAMINOPHEN 5; 325 MG/1; MG/1
1 TABLET ORAL EVERY 6 HOURS PRN
Qty: 28 TABLET | Refills: 0 | Status: SHIPPED | OUTPATIENT
Start: 2022-09-30 | End: 2022-10-07

## 2022-09-30 RX ORDER — POTASSIUM CHLORIDE 7.45 MG/ML
10 INJECTION INTRAVENOUS PRN
Status: DISCONTINUED | OUTPATIENT
Start: 2022-09-30 | End: 2022-10-04 | Stop reason: HOSPADM

## 2022-09-30 RX ORDER — ONDANSETRON 4 MG/1
4 TABLET, FILM COATED ORAL DAILY PRN
Qty: 30 TABLET | Refills: 0 | Status: SHIPPED | OUTPATIENT
Start: 2022-09-30

## 2022-09-30 RX ORDER — ONDANSETRON 2 MG/ML
4 INJECTION INTRAMUSCULAR; INTRAVENOUS EVERY 6 HOURS PRN
Status: DISCONTINUED | OUTPATIENT
Start: 2022-09-30 | End: 2022-10-04 | Stop reason: HOSPADM

## 2022-09-30 RX ORDER — HYDROMORPHONE HCL 110MG/55ML
PATIENT CONTROLLED ANALGESIA SYRINGE INTRAVENOUS PRN
Status: DISCONTINUED | OUTPATIENT
Start: 2022-09-30 | End: 2022-09-30 | Stop reason: SDUPTHER

## 2022-09-30 RX ORDER — DEXAMETHASONE SODIUM PHOSPHATE 4 MG/ML
INJECTION, SOLUTION INTRA-ARTICULAR; INTRALESIONAL; INTRAMUSCULAR; INTRAVENOUS; SOFT TISSUE PRN
Status: DISCONTINUED | OUTPATIENT
Start: 2022-09-30 | End: 2022-09-30 | Stop reason: SDUPTHER

## 2022-09-30 RX ORDER — METRONIDAZOLE 500 MG/100ML
500 INJECTION, SOLUTION INTRAVENOUS EVERY 6 HOURS
Status: DISCONTINUED | OUTPATIENT
Start: 2022-09-30 | End: 2022-10-01

## 2022-09-30 RX ORDER — SODIUM CHLORIDE, SODIUM LACTATE, POTASSIUM CHLORIDE, CALCIUM CHLORIDE 600; 310; 30; 20 MG/100ML; MG/100ML; MG/100ML; MG/100ML
INJECTION, SOLUTION INTRAVENOUS CONTINUOUS
Status: DISCONTINUED | OUTPATIENT
Start: 2022-09-30 | End: 2022-09-30

## 2022-09-30 RX ORDER — KETOROLAC TROMETHAMINE 30 MG/ML
15 INJECTION, SOLUTION INTRAMUSCULAR; INTRAVENOUS EVERY 6 HOURS PRN
Status: DISCONTINUED | OUTPATIENT
Start: 2022-09-30 | End: 2022-10-04 | Stop reason: HOSPADM

## 2022-09-30 RX ORDER — METOCLOPRAMIDE HYDROCHLORIDE 5 MG/ML
10 INJECTION INTRAMUSCULAR; INTRAVENOUS EVERY 6 HOURS
Status: DISCONTINUED | OUTPATIENT
Start: 2022-09-30 | End: 2022-10-04 | Stop reason: HOSPADM

## 2022-09-30 RX ORDER — SODIUM CHLORIDE 0.9 % (FLUSH) 0.9 %
5-40 SYRINGE (ML) INJECTION PRN
Status: DISCONTINUED | OUTPATIENT
Start: 2022-09-30 | End: 2022-10-04 | Stop reason: HOSPADM

## 2022-09-30 RX ORDER — SODIUM CHLORIDE 0.9 % (FLUSH) 0.9 %
5-40 SYRINGE (ML) INJECTION EVERY 12 HOURS SCHEDULED
Status: DISCONTINUED | OUTPATIENT
Start: 2022-09-30 | End: 2022-10-04 | Stop reason: HOSPADM

## 2022-09-30 RX ORDER — ONDANSETRON 2 MG/ML
INJECTION INTRAMUSCULAR; INTRAVENOUS PRN
Status: DISCONTINUED | OUTPATIENT
Start: 2022-09-30 | End: 2022-09-30 | Stop reason: SDUPTHER

## 2022-09-30 RX ORDER — ENOXAPARIN SODIUM 100 MG/ML
40 INJECTION SUBCUTANEOUS DAILY
Status: DISCONTINUED | OUTPATIENT
Start: 2022-10-02 | End: 2022-10-04 | Stop reason: HOSPADM

## 2022-09-30 RX ORDER — LIDOCAINE HYDROCHLORIDE 10 MG/ML
1 INJECTION, SOLUTION EPIDURAL; INFILTRATION; INTRACAUDAL; PERINEURAL
Status: DISCONTINUED | OUTPATIENT
Start: 2022-09-30 | End: 2022-09-30 | Stop reason: HOSPADM

## 2022-09-30 RX ORDER — PROPOFOL 10 MG/ML
INJECTION, EMULSION INTRAVENOUS PRN
Status: DISCONTINUED | OUTPATIENT
Start: 2022-09-30 | End: 2022-09-30 | Stop reason: SDUPTHER

## 2022-09-30 RX ORDER — ROCURONIUM BROMIDE 10 MG/ML
INJECTION, SOLUTION INTRAVENOUS PRN
Status: DISCONTINUED | OUTPATIENT
Start: 2022-09-30 | End: 2022-09-30 | Stop reason: SDUPTHER

## 2022-09-30 RX ORDER — FENTANYL CITRATE 50 UG/ML
50 INJECTION, SOLUTION INTRAMUSCULAR; INTRAVENOUS
Status: DISCONTINUED | OUTPATIENT
Start: 2022-09-30 | End: 2022-10-04 | Stop reason: HOSPADM

## 2022-09-30 RX ORDER — POLYETHYLENE GLYCOL 3350 17 G/17G
17 POWDER, FOR SOLUTION ORAL DAILY PRN
Status: DISCONTINUED | OUTPATIENT
Start: 2022-09-30 | End: 2022-10-02

## 2022-09-30 RX ORDER — SODIUM CHLORIDE 9 MG/ML
INJECTION, SOLUTION INTRAVENOUS PRN
Status: DISCONTINUED | OUTPATIENT
Start: 2022-09-30 | End: 2022-10-04 | Stop reason: HOSPADM

## 2022-09-30 RX ORDER — DIPHENHYDRAMINE HYDROCHLORIDE 50 MG/ML
12.5 INJECTION INTRAMUSCULAR; INTRAVENOUS
Status: DISCONTINUED | OUTPATIENT
Start: 2022-09-30 | End: 2022-09-30 | Stop reason: HOSPADM

## 2022-09-30 RX ORDER — ACETAMINOPHEN 325 MG/1
650 TABLET ORAL EVERY 4 HOURS PRN
Status: DISCONTINUED | OUTPATIENT
Start: 2022-09-30 | End: 2022-10-04 | Stop reason: HOSPADM

## 2022-09-30 RX ORDER — MAGNESIUM SULFATE 1 G/100ML
1000 INJECTION INTRAVENOUS PRN
Status: DISCONTINUED | OUTPATIENT
Start: 2022-09-30 | End: 2022-10-04 | Stop reason: HOSPADM

## 2022-09-30 RX ORDER — NALOXONE HYDROCHLORIDE 0.4 MG/ML
INJECTION, SOLUTION INTRAMUSCULAR; INTRAVENOUS; SUBCUTANEOUS PRN
Status: DISCONTINUED | OUTPATIENT
Start: 2022-09-30 | End: 2022-10-02

## 2022-09-30 RX ORDER — MIDAZOLAM HYDROCHLORIDE 1 MG/ML
INJECTION INTRAMUSCULAR; INTRAVENOUS PRN
Status: DISCONTINUED | OUTPATIENT
Start: 2022-09-30 | End: 2022-09-30 | Stop reason: SDUPTHER

## 2022-09-30 RX ORDER — ONDANSETRON 2 MG/ML
4 INJECTION INTRAMUSCULAR; INTRAVENOUS
Status: COMPLETED | OUTPATIENT
Start: 2022-09-30 | End: 2022-09-30

## 2022-09-30 RX ORDER — EPHEDRINE SULFATE/0.9% NACL/PF 50 MG/5 ML
SYRINGE (ML) INTRAVENOUS PRN
Status: DISCONTINUED | OUTPATIENT
Start: 2022-09-30 | End: 2022-09-30 | Stop reason: SDUPTHER

## 2022-09-30 RX ORDER — CEPHALEXIN 500 MG/1
CAPSULE ORAL
Qty: 21 CAPSULE | Refills: 0 | Status: SHIPPED | OUTPATIENT
Start: 2022-09-30 | End: 2022-10-17

## 2022-09-30 RX ORDER — LIDOCAINE HYDROCHLORIDE 10 MG/ML
INJECTION, SOLUTION EPIDURAL; INFILTRATION; INTRACAUDAL; PERINEURAL PRN
Status: DISCONTINUED | OUTPATIENT
Start: 2022-09-30 | End: 2022-09-30 | Stop reason: SDUPTHER

## 2022-09-30 RX ORDER — METRONIDAZOLE 500 MG/100ML
500 INJECTION, SOLUTION INTRAVENOUS ONCE
Status: COMPLETED | OUTPATIENT
Start: 2022-09-30 | End: 2022-09-30

## 2022-09-30 RX ORDER — METOCLOPRAMIDE HYDROCHLORIDE 5 MG/ML
INJECTION INTRAMUSCULAR; INTRAVENOUS PRN
Status: DISCONTINUED | OUTPATIENT
Start: 2022-09-30 | End: 2022-09-30 | Stop reason: SDUPTHER

## 2022-09-30 RX ORDER — FENTANYL CITRATE 50 UG/ML
INJECTION, SOLUTION INTRAMUSCULAR; INTRAVENOUS PRN
Status: DISCONTINUED | OUTPATIENT
Start: 2022-09-30 | End: 2022-09-30 | Stop reason: SDUPTHER

## 2022-09-30 RX ORDER — SODIUM CHLORIDE 9 MG/ML
INJECTION, SOLUTION INTRAVENOUS CONTINUOUS
Status: DISCONTINUED | OUTPATIENT
Start: 2022-09-30 | End: 2022-10-04 | Stop reason: HOSPADM

## 2022-09-30 RX ADMIN — FENTANYL CITRATE 50 MCG: 50 INJECTION, SOLUTION INTRAMUSCULAR; INTRAVENOUS at 17:21

## 2022-09-30 RX ADMIN — ROCURONIUM BROMIDE 20 MG: 50 INJECTION INTRAVENOUS at 13:46

## 2022-09-30 RX ADMIN — SODIUM CHLORIDE, POTASSIUM CHLORIDE, SODIUM LACTATE AND CALCIUM CHLORIDE: 600; 310; 30; 20 INJECTION, SOLUTION INTRAVENOUS at 15:23

## 2022-09-30 RX ADMIN — METOCLOPRAMIDE 10 MG: 5 INJECTION, SOLUTION INTRAMUSCULAR; INTRAVENOUS at 22:21

## 2022-09-30 RX ADMIN — METRONIDAZOLE 500 MG: 500 SOLUTION INTRAVENOUS at 23:17

## 2022-09-30 RX ADMIN — CEFAZOLIN 2000 MG: 10 INJECTION, POWDER, FOR SOLUTION INTRAVENOUS at 22:30

## 2022-09-30 RX ADMIN — ONDANSETRON 4 MG: 2 INJECTION INTRAMUSCULAR; INTRAVENOUS at 17:19

## 2022-09-30 RX ADMIN — HYDROMORPHONE HYDROCHLORIDE 0.4 MG: 2 INJECTION, SOLUTION INTRAMUSCULAR; INTRAVENOUS; SUBCUTANEOUS at 13:49

## 2022-09-30 RX ADMIN — FENTANYL CITRATE 50 MCG: 50 INJECTION, SOLUTION INTRAMUSCULAR; INTRAVENOUS at 15:29

## 2022-09-30 RX ADMIN — SUGAMMADEX 200 MG: 100 INJECTION, SOLUTION INTRAVENOUS at 16:30

## 2022-09-30 RX ADMIN — Medication: at 17:31

## 2022-09-30 RX ADMIN — SODIUM CHLORIDE: 9 INJECTION, SOLUTION INTRAVENOUS at 18:46

## 2022-09-30 RX ADMIN — HYDROMORPHONE HYDROCHLORIDE 0.4 MG: 2 INJECTION, SOLUTION INTRAMUSCULAR; INTRAVENOUS; SUBCUTANEOUS at 14:13

## 2022-09-30 RX ADMIN — HYDROMORPHONE HYDROCHLORIDE 0.5 MG: 1 INJECTION, SOLUTION INTRAMUSCULAR; INTRAVENOUS; SUBCUTANEOUS at 16:59

## 2022-09-30 RX ADMIN — METRONIDAZOLE 500 MG: 500 INJECTION, SOLUTION INTRAVENOUS at 13:25

## 2022-09-30 RX ADMIN — HYDROMORPHONE HYDROCHLORIDE 0.2 MG: 2 INJECTION, SOLUTION INTRAMUSCULAR; INTRAVENOUS; SUBCUTANEOUS at 14:01

## 2022-09-30 RX ADMIN — PANTOPRAZOLE SODIUM 40 MG: 40 INJECTION, POWDER, FOR SOLUTION INTRAVENOUS at 22:21

## 2022-09-30 RX ADMIN — PROPOFOL 200 MG: 10 INJECTION, EMULSION INTRAVENOUS at 13:20

## 2022-09-30 RX ADMIN — CEFAZOLIN 2000 MG: 10 INJECTION, POWDER, FOR SOLUTION INTRAVENOUS at 13:25

## 2022-09-30 RX ADMIN — HYDROMORPHONE HYDROCHLORIDE 0.5 MG: 2 INJECTION, SOLUTION INTRAMUSCULAR; INTRAVENOUS; SUBCUTANEOUS at 16:34

## 2022-09-30 RX ADMIN — HYDROMORPHONE HYDROCHLORIDE 0.5 MG: 1 INJECTION, SOLUTION INTRAMUSCULAR; INTRAVENOUS; SUBCUTANEOUS at 17:05

## 2022-09-30 RX ADMIN — ROCURONIUM BROMIDE 25 MG: 50 INJECTION INTRAVENOUS at 14:49

## 2022-09-30 RX ADMIN — ROCURONIUM BROMIDE 30 MG: 50 INJECTION INTRAVENOUS at 14:07

## 2022-09-30 RX ADMIN — Medication 50 MG: at 13:40

## 2022-09-30 RX ADMIN — SODIUM CHLORIDE, POTASSIUM CHLORIDE, SODIUM LACTATE AND CALCIUM CHLORIDE: 600; 310; 30; 20 INJECTION, SOLUTION INTRAVENOUS at 13:49

## 2022-09-30 RX ADMIN — FENTANYL CITRATE 50 MCG: 50 INJECTION, SOLUTION INTRAMUSCULAR; INTRAVENOUS at 14:13

## 2022-09-30 RX ADMIN — METOCLOPRAMIDE 10 MG: 5 INJECTION, SOLUTION INTRAMUSCULAR; INTRAVENOUS at 14:01

## 2022-09-30 RX ADMIN — Medication 10 MG: at 14:20

## 2022-09-30 RX ADMIN — FENTANYL CITRATE 100 MCG: 50 INJECTION, SOLUTION INTRAMUSCULAR; INTRAVENOUS at 13:20

## 2022-09-30 RX ADMIN — HYDROMORPHONE HYDROCHLORIDE 0.5 MG: 2 INJECTION, SOLUTION INTRAMUSCULAR; INTRAVENOUS; SUBCUTANEOUS at 15:44

## 2022-09-30 RX ADMIN — SODIUM CHLORIDE, POTASSIUM CHLORIDE, SODIUM LACTATE AND CALCIUM CHLORIDE: 600; 310; 30; 20 INJECTION, SOLUTION INTRAVENOUS at 11:57

## 2022-09-30 RX ADMIN — ONDANSETRON 4 MG: 2 INJECTION INTRAMUSCULAR; INTRAVENOUS at 13:26

## 2022-09-30 RX ADMIN — DEXAMETHASONE SODIUM PHOSPHATE 8 MG: 4 INJECTION, SOLUTION INTRAMUSCULAR; INTRAVENOUS at 13:26

## 2022-09-30 RX ADMIN — ROCURONIUM BROMIDE 50 MG: 50 INJECTION INTRAVENOUS at 13:20

## 2022-09-30 RX ADMIN — MIDAZOLAM 2 MG: 1 INJECTION INTRAMUSCULAR; INTRAVENOUS at 13:13

## 2022-09-30 RX ADMIN — SODIUM CHLORIDE, POTASSIUM CHLORIDE, SODIUM LACTATE AND CALCIUM CHLORIDE: 600; 310; 30; 20 INJECTION, SOLUTION INTRAVENOUS at 16:20

## 2022-09-30 RX ADMIN — LIDOCAINE HYDROCHLORIDE 50 MG: 10 INJECTION, SOLUTION EPIDURAL; INFILTRATION; INTRACAUDAL; PERINEURAL at 13:20

## 2022-09-30 ASSESSMENT — PAIN DESCRIPTION - ORIENTATION
ORIENTATION: LEFT;MID

## 2022-09-30 ASSESSMENT — PAIN DESCRIPTION - PAIN TYPE
TYPE: SURGICAL PAIN

## 2022-09-30 ASSESSMENT — PAIN SCALES - GENERAL
PAINLEVEL_OUTOF10: 7
PAINLEVEL_OUTOF10: 6
PAINLEVEL_OUTOF10: 7
PAINLEVEL_OUTOF10: 8

## 2022-09-30 ASSESSMENT — PAIN DESCRIPTION - LOCATION
LOCATION: ABDOMEN

## 2022-09-30 ASSESSMENT — PAIN DESCRIPTION - DESCRIPTORS
DESCRIPTORS: ACHING;DULL;TENDER

## 2022-09-30 ASSESSMENT — PAIN - FUNCTIONAL ASSESSMENT: PAIN_FUNCTIONAL_ASSESSMENT: 0-10

## 2022-09-30 NOTE — INTERVAL H&P NOTE
Update History & Physical    The patient's History and Physical of September 16, 2022 was reviewed with the patient and I examined the patient. There was no change. Pt reports completing bowel prep without complications, last BM reported this morning. He states last BM was clear with no stool particles. Physical exam remains unchanged including pulmonary and cardiac assessments. NPO since midnight. Pt does not wear dentures. Pt denies any hx of MRSA infection  Pt not currently taking any blood thinners or anticoagulants  Pt denies any personal or FHx of complications with anesthesia. Pt denies any acute symptoms of illness at this time including no SOB, CP, fever, URI or UTI symptoms.        Electronically signed by TIFFANY Shearer CNP on 9/30/2022 at 10:59 AM

## 2022-09-30 NOTE — PROGRESS NOTES
Pharmacy Note   Patient Controlled Analgesia (PCA) Consult    Keren Tobar is a 32 y.o. male ordered Fentanyl PCA for pain control due to Sigmoid colectomy with primary anastomosis. Consult received from Dr. Agnieszka Tang to manage therapy. Patient Active Problem List   Diagnosis    Perforation of sigmoid colon due to diverticulitis    Preop examination    Abscess of sigmoid colon due to diverticulitis       Allergies:  Adhesive tape    Height:    Ht Readings from Last 1 Encounters:   09/30/22 5' 10\" (1.778 m)    Actual Weight:    Wt Readings from Last 1 Encounters:   09/30/22 194 lb (88 kg)     IBW: 73 kg     BSA 2.08 m2    Pain history:  Acute Post OP pain    Current pain medications; Fentanyl PCA, Toradol    Vitals:    BP: 145 / 93   HR: 106  RR: 16  O2Sat: 98%    Pain score: 10, 1 mg hydromorphone POSS sedation score: 1      Assessment/Plan:  Severe pain level after 1 mg hydromorphone. \  Will give just a little bit higher dose 45 mcg versus 41.6  mcg calculated dose due to his high pain level. PCA DOSE: 45 mcg  LOCKOUT: 10 min  BASAL RATE: NONE  1 hour dose limit: 270 mcg  LOADING DOSE:  50 mcg        Thank you for your consult. Will continue to follow. [place] Ceballos. Ph.  9/30/2022  5:39 PM

## 2022-09-30 NOTE — ANESTHESIA POSTPROCEDURE EVALUATION
Department of Anesthesiology  Postprocedure Note    Patient: Stephan Garcia  MRN: [id number]  YOB: 1990  Date of evaluation: 9/30/2022      Procedure Summary     Date: 09/30/22 Room / Location: [facility]: [facility]    Anesthesia Start: 5672 Anesthesia Stop: 1646    Procedures:       Sigmoid colectomy with primary anastomosis. Mobilization of splenic flexure. Lysis of small bowel adhesion for 30 minutes  (Abdomen)      URETERAL CATHETER INSERTION (Bilateral: Ureter) Diagnosis:       Perforation of sigmoid colon due to diverticulitis      (PERFORATION OF SIGMOID COLON DUE TO DIVERTICULITIS)    Surgeons: Alexander Pagan MD; Faustino Muse MD Responsible Provider: Adeline Bishop MD    Anesthesia Type: general ASA Status: 2          Anesthesia Type: No value filed.     Twan Phase I: Twan Score: 9    Twan Phase II:        Anesthesia Post Evaluation    Comments: POST- ANESTHESIA EVALUATION       Pt Name: Stephan Garcia  MRN: [id number]  YOB: 1990  Date of evaluation: 9/30/2022  Time:  6:35 PM      BP (!) 145/76   Pulse (!) 103   Temp 97.3 °F (36.3 °C) (Oral)   Resp 10   Ht 5' 10\" (1.778 m)   Wt 194 lb (88 kg)   SpO2 100%   BMI 27.84 kg/m²      Consciousness Level  Awake  Cardiopulmonary Status  Stable  Pain Adequately Treated YES  Nausea / Vomiting  NO  Adequate Hydration  YES  Anesthesia Related Complications NONE      Electronically signed by Adeline Bishop MD on 9/30/2022 at 6:35 PM

## 2022-09-30 NOTE — DISCHARGE INSTR - COC
Continuity of Care Form    Patient Name: Hossein Iniguez   :  1990  MRN:  [id number]    Admit date:  2022  Discharge date:  ***    Code Status Order: Prior   Advance Directives:   [facility] Documentation       Date/Time Healthcare Directive Type of Healthcare Directive Copy in [address] Agent's Name Healthcare Agent's Phone Number    22 1124 No, patient does not have an advance directive for healthcare treatment -- -- -- -- --            Admitting Physician:  Stalin Felipe MD  PCP: TIFFANY Cadena CNP    Discharging Nurse: [facility] Unit/Room#: [facility]  Discharging Unit Phone Number: ***    Emergency Contact:   Extended Emergency Contact Information  Primary Emergency Contact: [address] Phone: [phone]  Relation: Other    Past Surgical History:  Past Surgical History:   Procedure Laterality Date    COLONOSCOPY N/A 2022    COLONOSCOPY DIAGNOSTIC performed by Stalin Felipe MD at [facility] [address] EXTRACTION         Immunization History: There is no immunization history on file for this patient.     Active Problems:  Patient Active Problem List   Diagnosis Code    Perforation of sigmoid colon due to diverticulitis K57.20    Preop examination Z01.818       Isolation/Infection:   Isolation            No Isolation          Patient Infection Status       None to display            Nurse Assessment:  Last Vital Signs: /81   Pulse 72   Temp 96.9 °F (36.1 °C) (Infrared)   Resp 16   Ht 5' 10\" (1.778 m)   Wt 194 lb (88 kg)   SpO2 99%   BMI 27.84 kg/m²     Last documented pain score (0-10 scale): Pain Level: 0  Last Weight:   Wt Readings from Last 1 Encounters:   22 194 lb (88 kg)     Mental Status:  {IP PT MENTAL STATUS:46675}    IV Access:  { [facility] IV ACCESS:534829700}    Nursing Mobility/ADLs:  Walking   {[facility] DME FOFK:294936702}  Transfer  {[facility] DME SQES:439345794}  Bathing  {[facility] DME UYFY:322596395}  Dressing  {[facility] DME SEYT:928521214}  Toileting  {[facility] D[facility] IUHY:923506780}  Feeding  {[facility] D[facility] DVHI:746572822}  Med Admin  {[facility] DME VJTO:158412637}  Med Delivery   { [facility] MED Delivery:547635304}    Wound Care Documentation and Therapy:  Incision 22 Abdomen (Active)   Dressing Status Clean;Dry; Intact 22 1637   Dressing/Treatment Other (comment) 22 1637   Closure Sutures; Staples 22 1637   Number of days: 0        Elimination:  Continence: Bowel: {YES / CL:27718}  Bladder: {YES / FW:79841}  Urinary Catheter: {Urinary Catheter:553742717}   Colostomy/Ileostomy/Ileal Conduit: {YES / XJ:23737}       Date of Last BM: ***    Intake/Output Summary (Last 24 hours) at 2022 1648  Last data filed at 2022 1620  Gross per 24 hour   Intake 3100 ml   Output 350 ml   Net 2750 ml     No intake/output data recorded.     Safety Concerns:     [employer] Safety Concerns:867537505}    Impairments/Disabilities:      [employer] Impairments/Disabilities:026822590}    Nutrition Therapy:  Current Nutrition Therapy:   [employer] Diet List:502271824}    Routes of Feeding: {[facility] DME Other Feedings:665111748}  Liquids: {Slp liquid thickness:67550}  Daily Fluid Restriction: {[facility] DME Yes amt example:105811123}  Last Modified Barium Swallow with Video (Video Swallowing Test): {Done Not Done KDZP:099206930}    Treatments at the Time of Hospital Discharge:   Respiratory Treatments: ***  Oxygen Therapy:  {Therapy; copd oxygen:03233}  Ventilator:    { CC Vent FYFD:193189127}    Rehab Therapies: {THERAPEUTIC INTERVENTION:5144026424}  Weight Bearing Status/Restrictions: [employer] Weight [employer]}  Other Medical Equipment (for information only, NOT a DME order):  {EQUIPMENT:322605367}  Other Treatments: ***    Patient's personal belongings (please select all that are sent with patient):  {[facility] DME Belongings:429492292}    RN SIGNATURE:  {Esignature:985988668}    CASE MANAGEMENT/SOCIAL WORK SECTION    Inpatient Status Date: ***    Readmission Risk Assessment Score:  Readmission Risk              Risk of Unplanned Readmission:  0           Discharging to Facility/ Agency   Name:   Address:  Phone:  Fax:    Dialysis Facility (if applicable)   Name:  Address:  Dialysis Schedule:  Phone:  Fax:    / signature: {Esignature:995504672}    PHYSICIAN SECTION    Prognosis: Good    Condition at Discharge: Stable    Rehab Potential (if transferring to Rehab): Good    Recommended Labs or Other Treatments After Discharge: Monitor drain output twice daily. Patient may shower. Low fiber diet. No driving until off pain medications. Physician Certification: I certify the above information and transfer of Kayla Marsh  is necessary for the continuing treatment of the diagnosis listed and that he requires [address] for less 30 days.      Update Admission H&P: No change in H&P    PHYSICIAN SIGNATURE:  Electronically signed by Ras Flaherty MD on 9/30/22 at 4:49 PM EDT

## 2022-09-30 NOTE — OP NOTE
Operative Note      Patient: Herlinda Beach  YOB: 1990  MRN: [id number]    Date of Procedure: 9/30/2022              Preoperative diagnosis: Perforated sigmoid diverticulitis with abscess    Postoperative diagnosis: Same    Procedure: Sigmoid colectomy with primary anastomosis. Mobilization of splenic flexure. Lysis of small bowel adhesion for 30 minutes    Surgeon: Dr. Mauricio Shukla    Asst.: None    Anesthesia: General    Preparation: Chloraprep/Hibiclens/mechanical bowel prep/bilateral ureteral catheters and a Renteria catheter    EBL: Less than 100 mL    Specimen: Sigmoid colon. Anastomotic donuts. Procedure: Informed consent was obtained. Preoperative antibiotic was given. Patient was taken to the operating room. General anesthesia was given. He was placed in the lithotomy position. Urology placed bilateral ureteral catheters and a Renteria catheter. Abdomen was prepped and draped in usual sterile fashion. Timeout was done. Lower midline laparotomy was performed. Abdominal cavity was entered. Bookwalter retractor was used for exposure. Patient was found to have some lower abdomen small bowel adhesions and those were carefully taken down for about 30 minutes. No serosal tears or enterotomies noted. Small bowel was packed in the upper abdomen. Further exploration in the pelvis reveals indurated area in this sigmoid colon consistent with evidence of history of sigmoid diverticulitis with abscess within the wall of the sigmoid colon. At this point I decided to proceed with sigmoid colectomy with primary anastomosis. Descending colon splenic flexure sigmoid colon all mobilized. Distal descending colon in the healthier portion was transected using a DEDRA 75 blue stapler. Mesentery control was obtained using the LigaSure. Careful dissection was continued in the distal sigmoid region. Distal sigmoid was transected using a blue curved contour stapler. Specimen was removed and sent to pathology. Pathologist confirmed evidence of diverticular disease induration but no neoplasm. At this point colon was already mobilized adequately. Colon was viable on both hands. At this point staple line was opened and a 25 EEA anvil was placed on the proximal end. After the anvil was placed in the lumen DEDRA-75 blue stapler was used and the very distal portion of the descending colon was transected using that stapler. Anvil was brought out anteriorly at this point through a small enterotomy in the descending colon. Proximal end was prepared for the anastomosis. It was viable. No tension noted. At this point assistant introduced sizer from below. Sizer was removed. A 25 EEA stapler was introduced. At this point a 25 powered EEA end-to-end anastomosis was accomplished without any difficulty. No tension noted. Blood supply was preserved. Stapler was fired and removed. Donuts were inspected. They were nice thick round and intact. At this point I scrubbed back in. Seromuscular sutures were placed anteriorly using 4-0 PDS suture. Surrounding fat pad was used to reinforce the anastomosis as well. Again no tension noted. Bowel was viable. Air test was performed that was negative. Abdominal cavity was copiously irrigated into the returning fluid was clear. Jarod-Cardoza drain was left in the pelvis was brought out and secured. Hemostasis was confirmed. Sponge needle instrument count was found to be correct. Seprafilm was left in the pelvis and below and above the omentum. After confirming hemostasis sponge needle instrument count fascia was approximated using looped PDS sutures x2. Wound was irrigated. Subcutaneous drain was left in place was brought out and secured. Skin was approximated using staples. All the drains were secured. Sterile dressing was applied. Both ureteral catheters were removed intact. Renteria catheter was left in place. Nasogastric tube was removed.   Patient tolerated procedure well and was transferred to the recovery room in a stable condition.  -  Recommendations: Findings discussed with the family. Postoperative course recovery restrictions follow-up were all discussed. Patient will be admitted to the hospital for further care.

## 2022-09-30 NOTE — BRIEF OP NOTE
Brief Postoperative Note      Patient: Virginia Garcia  YOB: 1990  MRN: [id number]    Date of Procedure: 9/30/2022    Pre-Op Diagnosis: PERFORATION OF SIGMOID COLON DUE TO DIVERTICULITIS    Post-Op Diagnosis: Same       Procedure(s):  OPEN BOWEL RESECTION SIGMOID COLECTOMY  URETERAL CATHETER INSERTION    Surgeon(s):  MD Ang Lennon MD    Assistant:  * No surgical staff found *    Anesthesia: General    Estimated Blood Loss (mL): Minimal    Complications: None    Specimens:   ID Type Source Tests Collected by Time Destination   1 : URINE FROM HANSON INSERTION Urine Urine, Cystoscopic URINALYSIS WITH REFLEX TO Dustin Galvez MD 9/30/2022 1342        Implants:  * No implants in log *      Drains: * No LDAs found *    Findings:     Normal cysto, u caths placed.      Electronically signed by Maggie Banuelos MD on 9/30/2022 at 1:54 PM

## 2022-09-30 NOTE — ANESTHESIA PRE PROCEDURE
Department of Anesthesiology  Preprocedure Note       Name:  Juan Jose Hernandez   Age:  32 y.o.  :  1990                                          MRN:  [id number]         Date:  2022      Surgeon: Brad De La Cruz):  MD Emma Reece MD    Procedure: Procedure(s):  OPEN BOWEL RESECTION SIGMOID COLECTOMY  URETERAL CATHETER INSERTION    Medications prior to admission:   Prior to Admission medications    Medication Sig Start Date End Date Taking? Authorizing Provider   erythromycin base (E-MYCIN) 500 MG tablet Take by mouth once  Patient not taking: No sig reported 22   Historical Provider, MD   neomycin (MYCIFRADIN) 500 MG tablet once  Patient not taking: No sig reported 22   Historical Provider, MD   valACYclovir (VALTREX) 1 g tablet Take 1,000 mg by mouth as needed Not currently taking 22    Historical Provider, MD       Current medications:    Current Facility-Administered Medications   Medication Dose Route Frequency Provider Last Rate Last Admin    ceFAZolin (ANCEF) 2000 mg in dextrose 5 % 50 mL IVPB  2,000 mg IntraVENous Once Epifanio Theodore MD        metronidazole (FLAGYL) 500 mg in 0.9% NaCl 100 mL IVPB premix  500 mg IntraVENous Once Epifanio Theodore MD        lidocaine PF 1 % injection 1 mL  1 mL IntraDERmal Once PRN Oksana Montilla MD        lactated ringers infusion   IntraVENous Continuous Oksana Montilla MD        sodium chloride flush 0.9 % injection 5-40 mL  5-40 mL IntraVENous 2 times per day Oksana Montilla MD        sodium chloride flush 0.9 % injection 5-40 mL  5-40 mL IntraVENous PRN Oksana Montilla MD        0.9 % sodium chloride infusion   IntraVENous PRN Oksana Montilla MD           Allergies:     Allergies   Allergen Reactions    Adhesive Tape Rash     Tegaderm- paper tape is ok       Problem List:    Patient Active Problem List   Diagnosis Code    Perforation of sigmoid colon due to diverticulitis K57.20    Preop examination Z01.818 Past Medical History:        Diagnosis Date    Diverticulitis     HSV (herpes simplex virus) infection        Past Surgical History:        Procedure Laterality Date    COLONOSCOPY N/A 9/27/2022    COLONOSCOPY DIAGNOSTIC performed by Isis Cooper MD at [facility]         Social History:    Social History     Tobacco Use    Smoking status: Never    Smokeless tobacco: Never   Substance Use Topics    Alcohol use: Never                                Counseling given: Not Answered      Vital Signs (Current): There were no vitals filed for this visit.                                            BP Readings from Last 3 Encounters:   09/27/22 (!) 140/78   09/16/22 (!) 145/96   07/28/22 136/78       NPO Status:                                                                                 BMI:   Wt Readings from Last 3 Encounters:   09/27/22 190 lb (86.2 kg)   09/16/22 190 lb (86.2 kg)   07/28/22 190 lb 9.6 oz (86.5 kg)     There is no height or weight on file to calculate BMI.    CBC:   Lab Results   Component Value Date/Time    WBC 8.1 09/16/2022 02:14 PM    RBC 5.22 09/16/2022 02:14 PM    HGB 15.7 09/16/2022 02:14 PM    HCT 46.1 09/16/2022 02:14 PM    MCV 88.5 09/16/2022 02:14 PM    RDW 15.1 09/16/2022 02:14 PM     09/16/2022 02:14 PM       CMP:   Lab Results   Component Value Date/Time     09/16/2022 02:14 PM    K 4.4 09/16/2022 02:14 PM     09/16/2022 02:14 PM    CO2 29 09/16/2022 02:14 PM    BUN 11 09/16/2022 02:14 PM    CREATININE 0.81 09/16/2022 02:14 PM    GFRAA >60 09/16/2022 02:14 PM    LABGLOM >60 09/16/2022 02:14 PM    GLUCOSE 121 09/16/2022 02:14 PM    PROT 7.7 09/16/2022 02:14 PM    CALCIUM 9.8 09/16/2022 02:14 PM    BILITOT 0.8 09/16/2022 02:14 PM    ALKPHOS 59 09/16/2022 02:14 PM    AST 18 09/16/2022 02:14 PM    ALT 22 09/16/2022 02:14 PM       POC Tests: No results for input(s): POCGLU, POCNA, POCK, POCCL, POCBUN, POCHEMO, POCHCT in the last 72 hours.    Coags:   Lab Results   Component Value Date/Time    PROTIME 13.7 07/07/2022 05:09 AM    INR 1.1 07/07/2022 05:09 AM       HCG (If Applicable): No results found for: PREGTESTUR, PREGSERUM, HCG, HCGQUANT     ABGs: No results found for: PHART, PO2ART, CFQ1TOU, BWS9KBW, BEART, L7HCHNRG     Type & Screen (If Applicable):  No results found for: LABABO, LABRH    Drug/Infectious Status (If Applicable):  Lab Results   Component Value Date/Time    HEPCAB NONREACTIVE 07/29/2022 07:41 AM       COVID-19 Screening (If Applicable): No results found for: COVID19        Anesthesia Evaluation  Patient summary reviewed and Nursing notes reviewed no history of anesthetic complications:   Airway: Mallampati: II  TM distance: >3 FB   Neck ROM: full  Mouth opening: > = 3 FB   Dental: normal exam         Pulmonary:Negative Pulmonary ROS and normal exam  breath sounds clear to auscultation                             Cardiovascular:Negative CV ROS            Rhythm: regular  Rate: normal                    Neuro/Psych:   Negative Neuro/Psych ROS              GI/Hepatic/Renal:   (+) renal disease: kidney stones,          ROS comment: PERFORATION OF SIGMOID COLON DUE TO DIVERTICULITIS. Endo/Other: Negative Endo/Other ROS                    Abdominal:             Vascular: negative vascular ROS. Other Findings:           Anesthesia Plan      general     ASA 2     (Possible central line placement if requested by surgeon.)  Induction: intravenous. MIPS: Postoperative opioids intended. Anesthetic plan and risks discussed with patient. Plan discussed with CRNA.                     Courtney Gonzales MD   9/30/2022

## 2022-09-30 NOTE — OP NOTE
[address] [facility]                 [address] [facility], [address]                                OPERATIVE REPORT    PATIENT NAME: Ирина Rivas                      :        1990  MED REC NO:   [id number]                              ROOM:  ACCOUNT NO:   [de-identified]                           ADMIT DATE: 2022  PROVIDER:     Eduardo Pope    DATE OF PROCEDURE:  2022    PREOPERATIVE DIAGNOSIS:  History of perforation of sigmoid colon due to  diverticulitis. POSTOPERATIVE DIAGNOSIS:  History of perforation of sigmoid colon due to  diverticulitis. OPERATION PERFORMED:  Cystoscopy with insertion of ureteral catheters  bilaterally. SURGEON:  Eduardo Pope MD    ANESTHESIA:  General.    COMPLICATIONS:  None. BLEEDING:  None. SPECIMENS:  Urine culture and sensitivity. DRAINS:  1.  16-[place] Renteria catheter. 2.  Bilateral 5-[place] open-ended ureteral catheters. HISTORY OF PRESENT ILLNESS:  The patient is a 35-year-old male who is  here today for sigmoid colon resection by Dr. Deann Espino, who requested  preoperative ureteral catheter placement. PROCEDURE IN DETAIL:  The patient was brought back to the operating room  and laid on the operating table in the supine position. Once general  anesthesia was obtained, he was placed in the dorsal lithotomy position  and prepped and draped in the usual sterile fashion. A time-out was  performed. He has properly identified. Antibiotics were administered. A cystoscope was inserted through the urethra, which was unremarkable  and entered into the bladder. Urine was collected and sent for culture  and sensitivity. The ureteral orifice was identified on the left and  right side. A wire was advanced in the left orifice and the 5-[place]  open-ended ureteral catheter was advanced into the left ureter with no  difficulty. This was repeated on the right side.   Once both ureteral  catheters were placed, cystoscopy had been done and was normal.  No  evidence of any fistula or inflammation was seen in the bladder. The  cystoscope was then removed. A Renteria catheter was inserted. The  balloon was inflated. The ureteral catheters were inserted and the  Renteria catheter was using in the Angiocath technique and tied down using  umbilical tape. At that point, my portion of procedure was completed. He tolerated it well. He is to undergo resection by Dr. Jg Bernard with  cath removal at the end of the operation.         Blanca Nayak    D: 09/30/2022 16:29:45       T: 09/30/2022 16:32:08     [email or web address]  Job#: [id number]     Doc#: [id number]    CC:

## 2022-10-01 LAB
ANION GAP SERPL CALCULATED.3IONS-SCNC: 13 MMOL/L (ref 9–17)
BUN BLDV-MCNC: 7 MG/DL (ref 6–20)
CALCIUM SERPL-MCNC: 9.2 MG/DL (ref 8.6–10.4)
CHLORIDE BLD-SCNC: 102 MMOL/L (ref 98–107)
CO2: 24 MMOL/L (ref 20–31)
CREAT SERPL-MCNC: 0.77 MG/DL (ref 0.7–1.2)
GFR AFRICAN AMERICAN: >60 ML/MIN
GFR NON-AFRICAN AMERICAN: >60 ML/MIN
GFR SERPL CREATININE-BSD FRML MDRD: ABNORMAL ML/MIN/{1.73_M2}
GLUCOSE BLD-MCNC: 146 MG/DL (ref 70–99)
HCT VFR BLD CALC: 42.3 % (ref 41–53)
HEMOGLOBIN: 14.8 G/DL (ref 13.5–17.5)
MCH RBC QN AUTO: 30.5 PG (ref 26–34)
MCHC RBC AUTO-ENTMCNC: 34.9 G/DL (ref 31–37)
MCV RBC AUTO: 87.4 FL (ref 80–100)
PDW BLD-RTO: 14.5 % (ref 11.5–14.9)
PLATELET # BLD: 342 K/UL (ref 150–450)
PMV BLD AUTO: 7.3 FL (ref 6–12)
POTASSIUM SERPL-SCNC: 4.5 MMOL/L (ref 3.7–5.3)
RBC # BLD: 4.84 M/UL (ref 4.5–5.9)
SODIUM BLD-SCNC: 139 MMOL/L (ref 135–144)
WBC # BLD: 14.9 K/UL (ref 3.5–11)

## 2022-10-01 PROCEDURE — 36415 COLL VENOUS BLD VENIPUNCTURE: CPT

## 2022-10-01 PROCEDURE — A4216 STERILE WATER/SALINE, 10 ML: HCPCS | Performed by: SURGERY

## 2022-10-01 PROCEDURE — 2580000003 HC RX 258: Performed by: SURGERY

## 2022-10-01 PROCEDURE — 2500000003 HC RX 250 WO HCPCS: Performed by: SURGERY

## 2022-10-01 PROCEDURE — C9113 INJ PANTOPRAZOLE SODIUM, VIA: HCPCS | Performed by: SURGERY

## 2022-10-01 PROCEDURE — 94761 N-INVAS EAR/PLS OXIMETRY MLT: CPT

## 2022-10-01 PROCEDURE — 6360000002 HC RX W HCPCS: Performed by: SURGERY

## 2022-10-01 PROCEDURE — 80048 BASIC METABOLIC PNL TOTAL CA: CPT

## 2022-10-01 PROCEDURE — 97110 THERAPEUTIC EXERCISES: CPT

## 2022-10-01 PROCEDURE — 85027 COMPLETE CBC AUTOMATED: CPT

## 2022-10-01 PROCEDURE — 97162 PT EVAL MOD COMPLEX 30 MIN: CPT

## 2022-10-01 PROCEDURE — 1200000000 HC SEMI PRIVATE

## 2022-10-01 PROCEDURE — 2700000000 HC OXYGEN THERAPY PER DAY

## 2022-10-01 RX ORDER — METRONIDAZOLE 500 MG/100ML
500 INJECTION, SOLUTION INTRAVENOUS EVERY 6 HOURS
Status: COMPLETED | OUTPATIENT
Start: 2022-10-01 | End: 2022-10-01

## 2022-10-01 RX ORDER — 0.9 % SODIUM CHLORIDE 0.9 %
1000 INTRAVENOUS SOLUTION INTRAVENOUS ONCE
Status: COMPLETED | OUTPATIENT
Start: 2022-10-01 | End: 2022-10-01

## 2022-10-01 RX ADMIN — SODIUM CHLORIDE: 9 INJECTION, SOLUTION INTRAVENOUS at 14:14

## 2022-10-01 RX ADMIN — METOCLOPRAMIDE 10 MG: 5 INJECTION, SOLUTION INTRAMUSCULAR; INTRAVENOUS at 17:56

## 2022-10-01 RX ADMIN — METRONIDAZOLE 500 MG: 500 SOLUTION INTRAVENOUS at 11:36

## 2022-10-01 RX ADMIN — KETOROLAC TROMETHAMINE 15 MG: 30 INJECTION, SOLUTION INTRAMUSCULAR at 20:57

## 2022-10-01 RX ADMIN — SODIUM CHLORIDE: 9 INJECTION, SOLUTION INTRAVENOUS at 02:55

## 2022-10-01 RX ADMIN — KETOROLAC TROMETHAMINE 15 MG: 30 INJECTION, SOLUTION INTRAMUSCULAR at 08:52

## 2022-10-01 RX ADMIN — KETOROLAC TROMETHAMINE 15 MG: 30 INJECTION, SOLUTION INTRAMUSCULAR at 14:58

## 2022-10-01 RX ADMIN — CEFAZOLIN 2000 MG: 10 INJECTION, POWDER, FOR SOLUTION INTRAVENOUS at 06:17

## 2022-10-01 RX ADMIN — SODIUM CHLORIDE 1000 ML: 9 INJECTION, SOLUTION INTRAVENOUS at 16:35

## 2022-10-01 RX ADMIN — Medication: at 18:29

## 2022-10-01 RX ADMIN — SODIUM CHLORIDE: 9 INJECTION, SOLUTION INTRAVENOUS at 22:39

## 2022-10-01 RX ADMIN — CEFAZOLIN 2000 MG: 10 INJECTION, POWDER, FOR SOLUTION INTRAVENOUS at 14:10

## 2022-10-01 RX ADMIN — METOCLOPRAMIDE 10 MG: 5 INJECTION, SOLUTION INTRAMUSCULAR; INTRAVENOUS at 06:12

## 2022-10-01 RX ADMIN — ONDANSETRON 4 MG: 2 INJECTION INTRAMUSCULAR; INTRAVENOUS at 03:11

## 2022-10-01 RX ADMIN — PANTOPRAZOLE SODIUM 40 MG: 40 INJECTION, POWDER, FOR SOLUTION INTRAVENOUS at 07:58

## 2022-10-01 RX ADMIN — METOCLOPRAMIDE 10 MG: 5 INJECTION, SOLUTION INTRAMUSCULAR; INTRAVENOUS at 11:36

## 2022-10-01 RX ADMIN — METRONIDAZOLE 500 MG: 500 SOLUTION INTRAVENOUS at 05:08

## 2022-10-01 ASSESSMENT — PAIN SCALES - GENERAL
PAINLEVEL_OUTOF10: 7
PAINLEVEL_OUTOF10: 7
PAINLEVEL_OUTOF10: 5
PAINLEVEL_OUTOF10: 4
PAINLEVEL_OUTOF10: 4
PAINLEVEL_OUTOF10: 0
PAINLEVEL_OUTOF10: 5
PAINLEVEL_OUTOF10: 9
PAINLEVEL_OUTOF10: 7

## 2022-10-01 ASSESSMENT — PAIN DESCRIPTION - DESCRIPTORS
DESCRIPTORS: SORE;SPASM
DESCRIPTORS: ACHING;SPASM

## 2022-10-01 ASSESSMENT — PAIN DESCRIPTION - LOCATION
LOCATION: ABDOMEN
LOCATION: ABDOMEN
LOCATION: ABDOMEN;SHOULDER

## 2022-10-01 NOTE — PROGRESS NOTES
10/01/22 1452   Encounter Summary   Encounter Overview/Reason  Spiritual/Emotional Needs   Service Provided For: Patient   Referral/Consult From: [address] [place] [address] Significant other;Family members   Last Encounter  10/01/22   Complexity of Encounter Moderate   Spiritual/Emotional needs   Type Spiritual Support   Assessment/Intervention/Outcome   Assessment Calm;Coping;Peaceful   Intervention Active listening;Prayer (assurance of)/[place];Sustaining Presence/Ministry of presence   Outcome Acceptance; Coping;Engaged in conversation;Peace; Receptive

## 2022-10-01 NOTE — PROGRESS NOTES
Physical Therapy  Facility/Department: [facility] MED SURG  Physical Therapy Initial Assessment    Name: Chio Haddad  : 1990  MRN: [id number]  Date of Service: 10/1/2022    Discharge Recommendations:  Home with assist PRN      Patient Diagnosis(es): The encounter diagnosis was Perforation of sigmoid colon due to diverticulitis. Past Medical History:  has a past medical history of Diverticulitis and HSV (herpes simplex virus) infection. Past Surgical History:  has a past surgical history that includes [place] tooth extraction; Colonoscopy (N/A, 2022); Sigmoid Colectomy (N/A, 2022); and Ureter surgery (Bilateral, 2022).     Assessment   Body Structures, Functions, Activity Limitations Requiring Skilled Therapeutic Intervention: Decreased balance;Decreased functional mobility   Treatment Diagnosis: Dec function  Therapy Prognosis: Good  Decision Making: Medium Complexity  Requires PT Follow-Up: Yes  Activity Tolerance  Activity Tolerance: Patient tolerated treatment well     Plan   Physcial Therapy Plan  General Plan: 1 time a day 7 days a week  Current Treatment Recommendations: Strengthening, Balance training, Functional mobility training, Transfer training, Gait training  Safety Devices  Type of Devices: Call light within reach, Gait belt, Left in chair     Restrictions  Restrictions/Precautions  Required Braces or Orthoses?: No     Subjective   Subjective  Subjective: Patient reports 6/10 pain in stomach     Social/Functional History  Social/Functional History  Lives With:  (grandparents)  Type of Home: House  Home Layout: Two level, Able to Live on Main level with bedroom/bathroom, Bed/Bath upstairs  Home Access: Stairs to enter without rails  Entrance Stairs - Number of Steps: 2  Bathroom Shower/Tub: Walk-in shower  Bathroom Toilet: Handicap height  Bathroom Equipment: Built-in shower seat  Has the patient had two or more falls in the past year or any fall with injury in the past year?: No  Receives Help From: Family (Reports grandparents and girlfriend will help him)  ADL Assistance: Independent  Homemaking Assistance: Independent  Homemaking Responsibilities: Yes  Ambulation Assistance: Independent  Transfer Assistance: Independent  Active : Yes  Mode of Transportation: Car  Occupation: Unemployed  Vision/Hearing  Vision  Vision: Impaired  Vision Exceptions: Wears glasses at all times  Hearing  Hearing: Within functional limits    Cognition         Objective   Heart Rate: 80  Heart Rate Source: Monitor  BP: (!) 125/57  BP Location: Right upper arm  BP Method: Automatic  MAP (Calculated): 79.67  Resp: 18  SpO2: 100 %  O2 Device: Nasal cannula    AROM RLE (degrees)  RLE AROM: WFL  AROM LLE (degrees)  LLE AROM : WFL  AROM RUE (degrees)  RUE AROM : WFL  AROM LUE (degrees)  LUE AROM : WFL    Bed mobility  Supine to Sit: Stand by assistance  Sit to Supine: Stand by assistance  Scooting: Stand by assistance    Transfers  Sit to Stand: Stand by assistance  Stand to Sit: Stand by assistance  Lateral Transfers: Stand by assistance    Ambulation  Surface: Level tile  Device: Rolling Walker  Assistance: Stand by assistance  Gait Deviations: Slow Divya  Distance: 8steps  Comments: guarded movements  More Ambulation?: No     Balance  Sitting - Static: Good  Sitting - Dynamic: Good  Standing - Static: Good  Standing - Dynamic: Fair;+  A/AROM Exercises: standing bilateral LE exercises x10 reps    OutComes Score  AM-PAC Score  AM-PAC Inpatient Mobility Raw Score : 18 (10/01/22 0913)  AM-PAC Inpatient T-Scale Score : 43.63 (10/01/22 0913)  Mobility Inpatient CMS 0-100% Score: 46.58 (10/01/22 0913)  Mobility Inpatient CMS G-Code Modifier : CK (10/01/22 0913)    Goals  Short Term Goals  Time Frame for Short Term Goals: 5 visits  Short Term Goal 1: Patient to demonstrate IND with bed mobility  Short Term Goal 2: Patient to demonstrate GOOD dynamic standing balance  Short Term Goal 3: Patient to demonstrate

## 2022-10-01 NOTE — PROGRESS NOTES
Patient was seen and examined. Awake alert in no acute distress. Afebrile vital signs are stable. Urine output is excellent. No nausea vomiting. Pain is controlled. Abdomen is benign. Incision is clean. EVELYN drain is serosanguineous. Extremity nontender. Blood work reviewed. BMP normal.  WBC count is 14.9 likely postop. Hemoglobin 14.8. Continue bowel rest.  Discontinue Renteria catheter today. Ambulate. Pulmonary toilet. Change dressing as needed. Continue GI/DVT prophylaxis. Toradol for breakthrough pain. Operative findings discussed with the patient.

## 2022-10-01 NOTE — PLAN OF CARE
Problem: Discharge Planning  Goal: Discharge to home or other facility with appropriate resources  10/1/2022 1730 by Tae Padilla RN  Outcome: Progressing  Flowsheets (Taken 10/1/2022 1730)  Discharge to home or other facility with appropriate resources:   Identify barriers to discharge with patient and caregiver   Arrange for needed discharge resources and transportation as appropriate   Identify discharge learning needs (meds, wound care, etc)   Arrange for interpreters to assist at discharge as needed   Refer to discharge planning if patient needs post-hospital services based on physician order or complex needs related to functional status, cognitive ability or social support system  Note: Discharge planners following for further discharge needs   10/1/2022 0442 by Lulu Roper RN  Outcome: Progressing     Problem: Pain  Goal: Verbalizes/displays adequate comfort level or baseline comfort level  10/1/2022 1730 by Tae Padilla RN  Outcome: Progressing  Flowsheets (Taken 10/1/2022 1730)  Verbalizes/displays adequate comfort level or baseline comfort level:   Encourage patient to monitor pain and request assistance   Assess pain using appropriate pain scale   Administer analgesics based on type and severity of pain and evaluate response   Implement non-pharmacological measures as appropriate and evaluate response   Consider cultural and social influences on pain and pain management   Notify Licensed Independent Practitioner if interventions unsuccessful or patient reports new pain  Note: Patients pain level decreased with prescribed pain medications.    10/1/2022 0442 by Lulu Roper RN  Outcome: Progressing     Problem: Safety - Adult  Goal: Free from fall injury  10/1/2022 1730 by Tae Padilla RN  Outcome: Progressing  Flowsheets (Taken 10/1/2022 1730)  Free From Fall Injury:   Instruct family/caregiver on patient safety   Based on caregiver fall risk screen, instruct family/caregiver to ask for assistance with transferring infant if caregiver noted to have fall risk factors  Note: No falls this shift. Call light with in reach, side rails up x2, bed in lowest postion. Patients safety maintained. 10/1/2022 0442 by Lori Hicks RN  Outcome: Progressing     Problem: ABCDS Injury Assessment  Goal: Absence of physical injury  10/1/2022 1730 by Stephanie Feng RN  Outcome: Progressing  Flowsheets (Taken 10/1/2022 1730)  Absence of Physical Injury: Implement safety measures based on patient assessment  Note: No injury this shift. Patients safety maintained.    10/1/2022 0442 by Lori Hicks, LUDA  Outcome: Progressing

## 2022-10-01 NOTE — PROGRESS NOTES
Pharmacy Note   Patient Controlled Analgesia (PCA) Consult    Virginia Garcia is a 32 y.o. male ordered fentanyl PCA for pain control due to Sigmoid colectomy with primary anastomosis. Consult received from Dr. Arsenio De Jesus to manage therapy. Patient Active Problem List   Diagnosis    Perforation of sigmoid colon due to diverticulitis    Preop examination    Abscess of sigmoid colon due to diverticulitis       Allergies:  Adhesive tape    Current pain medications; Fentanyl PCA, Toradol    Vitals:    BP: 130 / 68   HR: 82  RR: 17 O2Sat:98     EtCO2: 38       Pain score: 6  T or NT  POSS sedation score: 1            5 PCA  Attempts     5PCA Doses In (time frame 2512-8527)      Adverse effects of PCA: slight nausea     Stool with last 24 hrs:  Yes/No    Bowel regimen: reglan, protonix    Assessment/Plan:  Will continue with the current regimen. PCA DOSE: 45mcg  LOCKOUT: 10 min  BASAL RATE: NONE  1 hour dose limit: 270mcg  LOADING DOSE:  (USE APPROPRIATE SMARTPHRASE FOR LOAD)        Thank you for your consult. Will continue to follow.   [address] [facility], [place], [address] [facility]    10/1/2022   8:18 AM

## 2022-10-01 NOTE — ANESTHESIA POSTPROCEDURE EVALUATION
Department of Anesthesiology  Postprocedure Note    Patient: Herlinda Beach  MRN: [id number]  YOB: 1990  Date of evaluation: 10/1/2022      Procedure Summary     Date: 09/30/22 Room / Location: 18 [facility] / [address]    Anesthesia Start: 5327 Anesthesia Stop: 1646    Procedures:       Sigmoid colectomy with primary anastomosis. Mobilization of splenic flexure. Lysis of small bowel adhesion for 30 minutes  (Abdomen)      URETERAL CATHETER INSERTION (Bilateral: Ureter) Diagnosis:       Perforation of sigmoid colon due to diverticulitis      (PERFORATION OF SIGMOID COLON DUE TO DIVERTICULITIS)    Surgeons: Jeanie Kolb MD; Lorne Martin MD Responsible Provider: Debbie Navarrete MD    Anesthesia Type: general ASA Status: 2          Anesthesia Type: No value filed. Twan Phase I: Twan Score: 9    Twan Phase II:        Anesthesia Post Evaluation    Comments: POD #1. Patient seen lying in bed. Denies any anesthesia related issues. C/o lots of pain but he is using his PCA as instructed.

## 2022-10-01 NOTE — CARE COORDINATION
CASE MANAGEMENT NOTE:    Admission Date:  9/30/2022 Andrea Mehta is a 32 y.o.  male    Admitted for : Perforation of sigmoid colon due to diverticulitis [K57.20]  Abscess of sigmoid colon due to diverticulitis [K57.20]    Met with:  Patient    PCP:  Dr. Timothy Live:  Mikki Corea      Is patient alert and oriented at time of discussion:  Yes    Current Residence/ Living Arrangements:  independently at home             Current Services PTA:  No    Does patient go to outpatient dialysis: No  If yes, location and chair time:   Who is their nephrologist?     Is patient agreeable to VNS: No    Freedom of choice provided:  No    List of [address] provided: No    VNS chosen:  No    DME:  none    Home Oxygen: No    Nebulizer: No    CPAP/BIPAP: No    Supplier: N/A    Potential Assistance Needed: No    SNF needed: No    Freedom of choice and list provided: NA    Pharmacy:  [facility]       Is patient currently receiving oral anticoagulation therapy? No    Is the Patient an [facility] with Readmission Risk Score greater than 14%? No  If yes, pt needs a follow up appointment made within 7 days. Family Members/Caregivers that pt would like involved in their care:    Yes    If yes, list name here:  [facility]    Transportation Provider:  Family             Discharge Plan:  10/1/22 Medicaid Pt is from home with his family in a two story home DME none VNS denies Plan is to discharge to home with no needs will continue to follow for needs . //tv                  Electronically signed by:  Lia Wayne RN on 10/1/2022 at 3:37 PM

## 2022-10-02 LAB
ANION GAP SERPL CALCULATED.3IONS-SCNC: 10 MMOL/L (ref 9–17)
BUN BLDV-MCNC: 8 MG/DL (ref 6–20)
CALCIUM SERPL-MCNC: 8.9 MG/DL (ref 8.6–10.4)
CHLORIDE BLD-SCNC: 105 MMOL/L (ref 98–107)
CO2: 25 MMOL/L (ref 20–31)
CREAT SERPL-MCNC: 0.8 MG/DL (ref 0.7–1.2)
GFR AFRICAN AMERICAN: >60 ML/MIN
GFR NON-AFRICAN AMERICAN: >60 ML/MIN
GFR SERPL CREATININE-BSD FRML MDRD: ABNORMAL ML/MIN/{1.73_M2}
GLUCOSE BLD-MCNC: 103 MG/DL (ref 70–99)
HCT VFR BLD CALC: 39.4 % (ref 41–53)
HEMOGLOBIN: 13.5 G/DL (ref 13.5–17.5)
MCH RBC QN AUTO: 30.2 PG (ref 26–34)
MCHC RBC AUTO-ENTMCNC: 34.3 G/DL (ref 31–37)
MCV RBC AUTO: 88 FL (ref 80–100)
PDW BLD-RTO: 14.3 % (ref 11.5–14.9)
PLATELET # BLD: 296 K/UL (ref 150–450)
PMV BLD AUTO: 7.5 FL (ref 6–12)
POTASSIUM SERPL-SCNC: 4.7 MMOL/L (ref 3.7–5.3)
RBC # BLD: 4.48 M/UL (ref 4.5–5.9)
SODIUM BLD-SCNC: 140 MMOL/L (ref 135–144)
WBC # BLD: 12.8 K/UL (ref 3.5–11)

## 2022-10-02 PROCEDURE — 80048 BASIC METABOLIC PNL TOTAL CA: CPT

## 2022-10-02 PROCEDURE — 1200000000 HC SEMI PRIVATE

## 2022-10-02 PROCEDURE — 2580000003 HC RX 258: Performed by: SURGERY

## 2022-10-02 PROCEDURE — C9113 INJ PANTOPRAZOLE SODIUM, VIA: HCPCS | Performed by: SURGERY

## 2022-10-02 PROCEDURE — 36415 COLL VENOUS BLD VENIPUNCTURE: CPT

## 2022-10-02 PROCEDURE — 97530 THERAPEUTIC ACTIVITIES: CPT

## 2022-10-02 PROCEDURE — 94761 N-INVAS EAR/PLS OXIMETRY MLT: CPT

## 2022-10-02 PROCEDURE — 85027 COMPLETE CBC AUTOMATED: CPT

## 2022-10-02 PROCEDURE — A4216 STERILE WATER/SALINE, 10 ML: HCPCS | Performed by: SURGERY

## 2022-10-02 PROCEDURE — 97166 OT EVAL MOD COMPLEX 45 MIN: CPT

## 2022-10-02 PROCEDURE — 6360000002 HC RX W HCPCS: Performed by: SURGERY

## 2022-10-02 RX ORDER — FENTANYL CITRATE 50 UG/ML
100 INJECTION, SOLUTION INTRAMUSCULAR; INTRAVENOUS
Status: DISCONTINUED | OUTPATIENT
Start: 2022-10-02 | End: 2022-10-04 | Stop reason: HOSPADM

## 2022-10-02 RX ORDER — FENTANYL CITRATE 50 UG/ML
50 INJECTION, SOLUTION INTRAMUSCULAR; INTRAVENOUS
Status: DISCONTINUED | OUTPATIENT
Start: 2022-10-02 | End: 2022-10-04 | Stop reason: HOSPADM

## 2022-10-02 RX ADMIN — SODIUM CHLORIDE: 9 INJECTION, SOLUTION INTRAVENOUS at 07:14

## 2022-10-02 RX ADMIN — KETOROLAC TROMETHAMINE 15 MG: 30 INJECTION, SOLUTION INTRAMUSCULAR at 11:55

## 2022-10-02 RX ADMIN — METOCLOPRAMIDE 10 MG: 5 INJECTION, SOLUTION INTRAMUSCULAR; INTRAVENOUS at 11:55

## 2022-10-02 RX ADMIN — FENTANYL CITRATE 50 MCG: 50 INJECTION, SOLUTION INTRAMUSCULAR; INTRAVENOUS at 23:11

## 2022-10-02 RX ADMIN — PANTOPRAZOLE SODIUM 40 MG: 40 INJECTION, POWDER, FOR SOLUTION INTRAVENOUS at 08:58

## 2022-10-02 RX ADMIN — ENOXAPARIN SODIUM 40 MG: 100 INJECTION SUBCUTANEOUS at 08:57

## 2022-10-02 RX ADMIN — KETOROLAC TROMETHAMINE 15 MG: 30 INJECTION, SOLUTION INTRAMUSCULAR at 17:50

## 2022-10-02 RX ADMIN — KETOROLAC TROMETHAMINE 15 MG: 30 INJECTION, SOLUTION INTRAMUSCULAR at 03:02

## 2022-10-02 RX ADMIN — SODIUM CHLORIDE: 9 INJECTION, SOLUTION INTRAVENOUS at 21:14

## 2022-10-02 RX ADMIN — METOCLOPRAMIDE 10 MG: 5 INJECTION, SOLUTION INTRAMUSCULAR; INTRAVENOUS at 17:50

## 2022-10-02 RX ADMIN — SODIUM CHLORIDE: 9 INJECTION, SOLUTION INTRAVENOUS at 14:14

## 2022-10-02 RX ADMIN — METOCLOPRAMIDE 10 MG: 5 INJECTION, SOLUTION INTRAMUSCULAR; INTRAVENOUS at 06:14

## 2022-10-02 RX ADMIN — METOCLOPRAMIDE 10 MG: 5 INJECTION, SOLUTION INTRAMUSCULAR; INTRAVENOUS at 00:29

## 2022-10-02 ASSESSMENT — PAIN SCALES - GENERAL
PAINLEVEL_OUTOF10: 7
PAINLEVEL_OUTOF10: 3
PAINLEVEL_OUTOF10: 2
PAINLEVEL_OUTOF10: 5
PAINLEVEL_OUTOF10: 1
PAINLEVEL_OUTOF10: 7
PAINLEVEL_OUTOF10: 3

## 2022-10-02 ASSESSMENT — PAIN DESCRIPTION - DESCRIPTORS
DESCRIPTORS: ACHING
DESCRIPTORS: SPASM
DESCRIPTORS: SHARP
DESCRIPTORS: ACHING

## 2022-10-02 ASSESSMENT — PAIN DESCRIPTION - ORIENTATION
ORIENTATION: RIGHT
ORIENTATION: LEFT

## 2022-10-02 ASSESSMENT — PAIN DESCRIPTION - LOCATION
LOCATION: ABDOMEN

## 2022-10-02 ASSESSMENT — PAIN DESCRIPTION - PAIN TYPE: TYPE: SURGICAL PAIN

## 2022-10-02 NOTE — PROGRESS NOTES
Patient was seen and examined. Awake alert in no acute distress. Up ambulating. Afebrile vital signs are stable. Voiding well. Hardly using his PCA. No nausea vomiting. No bowel function yet. Abdomen is benign. Incision is clean. EVELYN drains are serosanguineous. Extremity nontender. Blood work reviewed. BMP normal.  WBC count is improved. Hemoglobin 13.5. Discontinue PCA. IV fentanyl as needed. Possibly clear liquids tomorrow. All questions answered. Patient overall is doing well.

## 2022-10-02 NOTE — PLAN OF CARE
Problem: Discharge Planning  Goal: Discharge to home or other facility with appropriate resources  Outcome: Progressing  Flowsheets (Taken 10/2/2022 1638)  Discharge to home or other facility with appropriate resources:   Identify barriers to discharge with patient and caregiver   Arrange for needed discharge resources and transportation as appropriate   Identify discharge learning needs (meds, wound care, etc)   Arrange for interpreters to assist at discharge as needed   Refer to discharge planning if patient needs post-hospital services based on physician order or complex needs related to functional status, cognitive ability or social support system  Note: Discharge planners following for further discharge needs      Problem: Pain  Goal: Verbalizes/displays adequate comfort level or baseline comfort level  Outcome: Progressing  Flowsheets (Taken 10/2/2022 1638)  Verbalizes/displays adequate comfort level or baseline comfort level:   Encourage patient to monitor pain and request assistance   Assess pain using appropriate pain scale   Administer analgesics based on type and severity of pain and evaluate response   Implement non-pharmacological measures as appropriate and evaluate response   Consider cultural and social influences on pain and pain management  Note: Patients pain level decreased with prescribed pain medications. Problem: Safety - Adult  Goal: Free from fall injury  Outcome: Progressing  Flowsheets (Taken 10/2/2022 1638)  Free From Fall Injury:   Instruct family/caregiver on patient safety   Based on caregiver fall risk screen, instruct family/caregiver to ask for assistance with transferring infant if caregiver noted to have fall risk factors  Note: No falls this shift. Call light with in reach, side rails up x2, bed in lowest postion. Patients safety maintained.       Problem: ABCDS Injury Assessment  Goal: Absence of physical injury  Outcome: Progressing  Flowsheets (Taken 10/2/2022 1638)  Absence of Physical Injury: Implement safety measures based on patient assessment  Note: No injury this shift. Patients safety maintained.

## 2022-10-02 NOTE — PROGRESS NOTES
[address]    Occupational Therapy Evaluation  Date: 10/2/22  Patient Name: Gian Beyer       Room:   MRN: [id number]  Account: [de-identified]   : 1990  (32 y.o.) Gender: male     LUDA Engle reports patient is medically stable for therapy treatment this date. Chart reviewed prior to treatment and patient is agreeable for therapy. All lines intact and patient positioned comfortably at end of treatment. All patient needs addressed prior to ending therapy session. Discharge Recommendations: The patient's needs are being met with no further Occupational Therapy recommended at discharge. OT Equipment Recommendations  Other: Continue to assess    Referring Practitioner: Vipul Boyle MD    Additional Pertinent Hx: Per HPI: Gian Beyer is 32 y.o.,  male, will have open bowel resection sigmoid colectomy, with ureteral cathter insertion on 22. Pt was recently in ED twice on 22 and 22 due to diverticulitis and HSV  infection. Pt had CT scan done which showed perforated diverticulitis, with improvement of the area of phlegmon/fluid collection seen on the previous examination and  Mild persistent inflammatory changes are identified. Pt states he has been following a strict diet in order to control his symptoms of diverticulitis, he avoiding nuts, popcorn, and seeds. He will have Diagnostic Colonoscopy on 2022 per Dr Deann Espino. Pt has No Prior Colonoscopy was done before. Patient denies any  FH of Colon Cancer. Patient reports no changes in bowel habits. No constipation or diarrhea . No GI /Rectal bleeding, experiencing red/ black/ BRBPR stools. Pt states he still has some intermittent mild mid abdominal discomfort  pain as pressure , pain rated 1-2 /10  on the 0-10 scale. With abdominal bloating some times, no nausea or vomiting, no weight loss. Patient denies any Dysphagia. Pt has hx of GERD   Barker's Esophagus.  Pt is on a PPI./ Antacid, that is helping to control symptoms. Treatment Diagnosis: Impaired self care status    Past Medical History:  has a past medical history of Diverticulitis and HSV (herpes simplex virus) infection. Past Surgical History:   has a past surgical history that includes [place] tooth extraction; Colonoscopy (N/A, 9/27/2022); Sigmoid Colectomy (N/A, 9/30/2022); and Ureter surgery (Bilateral, 9/30/2022). Restrictions  Restrictions/Precautions  Restrictions/Precautions: NPO;Up as Tolerated;General Precautions (NPO except ice chips)  Required Braces or Orthoses?: Yes  Required Braces or Orthoses  Other: Abdominal Binder (On when OOB)        Subjective     Subjective  Subjective: Pt standing at urinal upon entry with girlfriend. Agreeable to OT evaluation. Pt rating pain in abdomen at 4-5/10. Social/Functional History  Social/Functional History  Lives With: Family (Grandparents)  Type of Home: House  Home Layout: Two level, Able to Live on Main level with bedroom/bathroom, Bed/Bath upstairs  Home Access: Stairs to enter without rails  Entrance Stairs - Number of Steps: 2  Bathroom Shower/Tub: Walk-in shower  Bathroom Toilet: Handicap height  Bathroom Equipment: Built-in shower seat  Has the patient had two or more falls in the past year or any fall with injury in the past year?: No  Receives Help From: Family (Reports grandparents and girlfriend will help him)  ADL Assistance: Independent  Homemaking Assistance: Independent  Homemaking Responsibilities: Yes  Ambulation Assistance: Independent  Transfer Assistance: Independent  Active : Yes  Mode of Transportation: Car  Occupation: Unemployed  [address]: Fishing, hiking, watching [employer]  IADL Comments: Pt reports his bed is on the second floor, tentative plan to sleep in a recliner/chair on main level at discharge.   Additional Comments: Pt reports his grandparents and girlfriend will be able to assist PRN upon discharge. Objective  Cognition  Orientation  Overall Orientation Status: Within Functional Limits  Orientation Level: Oriented X4  Cognition  Overall Cognitive Status: WFL  Arousal/Alertness: Appropriate responses to stimuli  Following Commands: Follows all commands without difficulty  Attention Span: Appears intact  Memory: Appears intact  Safety Judgement: Decreased awareness of need for assistance  Problem Solving: Able to problem solve independently  Insights: Fully aware of deficits  Initiation: Does not require cues  Sequencing: Does not require cues  Patient affect[de-identified] Normal   Sensation  Overall Sensation Status: WFL    Activities of Daily Living  ADL  Feeding: NPO (*Pt currently NPO s/p surgery, however displays no difficulty with hand to mouth movements)  Grooming: Setup, Stand by assistance (From a standing position)  UE Bathing: Minimal assistance, Setup  LE Bathing: Moderate assistance  UE Dressing: Minimal assistance  LE Dressing: Moderate assistance (ModA for footwear/threading clothing over feet)  Toileting: Minimal assistance, Increased time to complete, Setup  Toileting Skilled Clinical Factors: Pt engaged in toileting tasks upon entry, with pt's girlfriend providing Dioni for clothing mgnt and placement of urinal.  Additional Comments: ADL scores derived from clinical reasoning and skilled observation as indicated. Pt's participation in ADLs limited d/t decreased functional activity tolerance, fatigue, pain at surgical site, and position/lifting restrictions s/p abdominal surgery. Provided education to pt on long handled equipment/EC tech/compensatory tech for self care tasks s/p surgery, with pt declining training on AE at this time. Stated pt's girlfriend will be assisting him with self care PRN.          UE Function  LUE AROM (degrees)  LUE AROM : WFL  Left Hand AROM (degrees)  Left Hand AROM: WFL  Tone LUE  LUE Tone: Normotonic  LUE Strength  Gross LUE Strength: WFL  LUE Strength Comment: MMT not tested as pt is s/p recent abdominal surgery. LUE appeared WFLs when used in function. RUE AROM (degrees)  RUE AROM : WFL  Right Hand AROM (degrees)  Right Hand AROM: WFL  Tone RUE  RUE Tone: Normotonic  RUE Strength  Gross RUE Strength: WFL  RUE Strength Comment: MMT not tested as pt is s/p recent abdominal surgery. RUE appeared WFLs when used in function. Fine Motor Skills/Coordination  Coordination  Movements Are Fluid And Coordinated: Yes                Mobility  Bed Mobility  Bed Mobility Comments: Bed mobility not assessed this date; pt OOB upon arrival and retired to bedside chair at session end. Balance  Balance  Sitting Balance: Independent  Standing Balance: Stand by assistance  Standing Balance  Time: ~20 mins  Activity: Functional mobility about room; standing to answer questions during evaluation  Comment: Pt demo'd no LOBs throughout, able to stand unsupported at session beginning for ~10 minutes. Pt declining use of RW at this time, used IV pole as support to complete functional mobilty about room. Assist provided for line mgnt. Transfers  Transfers  Sit to stand: Stand by assistance  Stand to sit: Stand by assistance  Transfer Comments: Min VCs provided to square self to transfer surface, reach behind/utilize chair armrest, and controlled descent to chair all to increase safety. Assist provided for multiple line mgnt. Functional Mobility  Functional - Mobility Device: Other (IV pole)  Activity:  (About hospital room, bedside chair>door>bedside chair)  Assist Level: Stand by assistance  Functional Mobility Comments: Pt completed functional mobility about room with SBA and slow pacing. Pt demo'd no LOBs. Min VCs provided for line awareness, obstacle negotiation and scanning the environment to reduce fall risk.     Assessment  Assessment  Performance deficits / Impairments: Decreased functional mobility , Decreased ADL status, Decreased safe awareness, Decreased endurance, Decreased high-level IADLs, Decreased strength  Treatment Diagnosis: Impaired self care status  Prognosis: Good  Decision Making: Medium Complexity  Discharge Recommendations: Home with assist PRN    Activity Tolerance  Activity Tolerance: Patient Tolerated treatment well, Patient limited by fatigue    Safety Devices  Type of Devices: Call light within reach, Gait belt, Patient at risk for falls, Left in chair, Nurse notified (Pt's girlfriend present in room at session end.)    Patient Education  Patient Education  Education Given To: Patient, Family  Education Provided: Role of Therapy, Plan of Care, Precautions, ADL Adaptive Strategies, Transfer Training, Energy Conservation, Equipment, Fall Prevention Strategies  Education Method: Verbal  Barriers to Learning: None  Education Outcome: Verbalized understanding      Functional Outcome Measures  AM-PAC Daily Activity Inpatient   How much help for putting on and taking off regular lower body clothing?: A Lot  How much help for Bathing?: A Lot  How much help for Toileting?: A Little  How much help for putting on and taking off regular upper body clothing?: A Little  How much help for taking care of personal grooming?: A Little  How much help for eating meals?: None (NPO except ice chips s/p surgery)  [facility] Inpatient Daily Activity Raw Score: 17  AM-PAC Inpatient ADL T-Scale Score : 37.26  ADL Inpatient CMS 0-100% Score: 50.11  ADL Inpatient CMS G-Code Modifier : CK       Goals  Patient Goals   Patient goals : \"To go get out of here and go home! \"  Short Term Goals  Time Frame for Short Term Goals: By discharge, pt to demo:  Short Term Goal 1: ADL transfers and functionl mobility with MOD I/IND and Good safety. Short Term Goal 2: UB ADLs to MOD I/IND and LB ADLs to SBA with use of AD/compensatory strategies/AE as needed. Short Term Goal 3: toileting tasks with SBA and use of AD/grab bars/BSC as needed.   Short Term Goal 4: active participation in therapeutic activity/exercise for 15+ mins in order to promote functional activity tolerance/dynamic balance required for safety/IND with self care tasks. Short Term Goal 5: Pt/caregivers to be IND with fall prevention strategies, home , abdominal precautions, AE/compensatory ADL strategies, and discharge/equipment recommendations with use of handouts as needed.     Plan  Occupational Therapy Plan  Times Per Week: 5-6x/week 1-2x/day as tolerated      OT Individual Minutes  OT Individual Minutes  Time In: 1001  Time Out: 6289  Minutes: 32  Time Code Minutes   Timed Code Treatment Minutes: 18 Minutes        Electronically signed by Deysi Ford OT on 10/2/22 at 11:24 AM EDT 6

## 2022-10-02 NOTE — PLAN OF CARE
Problem: Discharge Planning  Goal: Discharge to home or other facility with appropriate resources  10/2/2022 0140 by Deon Walker RN  Outcome: Progressing  Flowsheets (Taken 10/1/2022 1925)  Discharge to home or other facility with appropriate resources:   Identify barriers to discharge with patient and caregiver   Arrange for needed discharge resources and transportation as appropriate   Identify discharge learning needs (meds, wound care, etc)  10/1/2022 1730 by Stephanie Feng RN  Outcome: Progressing  Flowsheets (Taken 10/1/2022 1730)  Discharge to home or other facility with appropriate resources:   Identify barriers to discharge with patient and caregiver   Arrange for needed discharge resources and transportation as appropriate   Identify discharge learning needs (meds, wound care, etc)   Arrange for interpreters to assist at discharge as needed   Refer to discharge planning if patient needs post-hospital services based on physician order or complex needs related to functional status, cognitive ability or social support system  Note: Discharge planners following for further discharge needs      Problem: Pain  Goal: Verbalizes/displays adequate comfort level or baseline comfort level  10/2/2022 0140 by Deon Walker RN  Outcome: Progressing  10/1/2022 1730 by Stephanie Feng RN  Outcome: Progressing  Flowsheets (Taken 10/1/2022 1730)  Verbalizes/displays adequate comfort level or baseline comfort level:   Encourage patient to monitor pain and request assistance   Assess pain using appropriate pain scale   Administer analgesics based on type and severity of pain and evaluate response   Implement non-pharmacological measures as appropriate and evaluate response   Consider cultural and social influences on pain and pain management   Notify Licensed Independent Practitioner if interventions unsuccessful or patient reports new pain  Note: Patients pain level decreased with prescribed pain medications.       Problem: Safety - Adult  Goal: Free from fall injury  10/2/2022 0140 by Luis Miguel Jaimes RN  Outcome: Progressing  Flowsheets (Taken 10/1/2022 1925)  Free From Fall Injury: Instruct family/caregiver on patient safety  10/1/2022 1730 by Magui Cross RN  Outcome: Progressing  Flowsheets (Taken 10/1/2022 1730)  Free From Fall Injury:   Instruct family/caregiver on patient safety   Based on caregiver fall risk screen, instruct family/caregiver to ask for assistance with transferring infant if caregiver noted to have fall risk factors  Note: No falls this shift. Call light with in reach, side rails up x2, bed in lowest postion. Patients safety maintained. Problem: ABCDS Injury Assessment  Goal: Absence of physical injury  10/2/2022 0140 by Luis Miguel Jaimes RN  Outcome: Progressing  Flowsheets (Taken 10/1/2022 1925)  Absence of Physical Injury: Implement safety measures based on patient assessment  10/1/2022 1730 by Magui Cross RN  Outcome: Progressing  Flowsheets (Taken 10/1/2022 1730)  Absence of Physical Injury: Implement safety measures based on patient assessment  Note: No injury this shift. Patients safety maintained.

## 2022-10-02 NOTE — PLAN OF CARE
Problem: Pain  Goal: Verbalizes/displays adequate comfort level or baseline comfort level  10/2/2022 1744 by Kayy Todd RN  Outcome: Progressing  Flowsheets (Taken 10/2/2022 1744)  Verbalizes/displays adequate comfort level or baseline comfort level:   Administer analgesics based on type and severity of pain and evaluate response   Assess pain using appropriate pain scale   Encourage patient to monitor pain and request assistance   Implement non-pharmacological measures as appropriate and evaluate response   Consider cultural and social influences on pain and pain management   Notify Licensed Independent Practitioner if interventions unsuccessful or patient reports new pain  Note: PT. Received pain meds in timely manner. Teach pt. Non-pharm. Methods to handle pain. 10/2/2022 1638 by Ambreen Perez RN  Outcome: Progressing  Flowsheets (Taken 10/2/2022 1638)  Verbalizes/displays adequate comfort level or baseline comfort level:   Encourage patient to monitor pain and request assistance   Assess pain using appropriate pain scale   Administer analgesics based on type and severity of pain and evaluate response   Implement non-pharmacological measures as appropriate and evaluate response   Consider cultural and social influences on pain and pain management  Note: Patients pain level decreased with prescribed pain medications. Problem: Safety - Adult  Goal: Free from fall injury  10/2/2022 1744 by Kayy Todd RN  Outcome: Progressing  Flowsheets (Taken 10/2/2022 1744)  Free From Fall Injury:   Verdene Heckle family/caregiver on patient safety   Based on caregiver fall risk screen, instruct family/caregiver to ask for assistance with transferring infant if caregiver noted to have fall risk factors  Note: Made sure call light was in reach, a clear pathway and adequate lighting was provided. Also made sure that the pt. Was wearing non-slip socks.     10/2/2022 1638 by Ambreen Perez RN  Outcome: Progressing  Flowsheets (Taken 10/2/2022 1638)  Free From Fall Injury:   Instruct family/caregiver on patient safety   Based on caregiver fall risk screen, instruct family/caregiver to ask for assistance with transferring infant if caregiver noted to have fall risk factors  Note: No falls this shift. Call light with in reach, side rails up x2, bed in lowest postion. Patients safety maintained. Problem: ABCDS Injury Assessment  Goal: Absence of physical injury  10/2/2022 1744 by Tenzin Major RN  Outcome: Progressing  Flowsheets (Taken 10/2/2022 1744)  Absence of Physical Injury: Implement safety measures based on patient assessment  Note: Notify pt.of possible side effect of meds. Make sure pt. Know to call out before getting up if needs assistance. 10/2/2022 1638 by Stephanie Feng RN  Outcome: Progressing  Flowsheets (Taken 10/2/2022 1638)  Absence of Physical Injury: Implement safety measures based on patient assessment  Note: No injury this shift. Patients safety maintained.

## 2022-10-02 NOTE — CARE COORDINATION
ONGOING DISCHARGE PLAN:    Patient is alert and oriented x4. Spoke with patient regarding discharge plan and patient confirms that plan is still to discharge to home with no needs    Patient offered vns pt states that he has lots of help at home  Renteria removed  NPO with ICE chips only   Up to chair  Toradol and Reglan    Will continue to follow for additional discharge needs.     Electronically signed by Gianna Harrington RN on 10/2/2022 at 11:19 AM

## 2022-10-02 NOTE — PROGRESS NOTES
Pt got up to chair independently. Re-educating done regarding incentive spirometer use. Also educated pt on importance of movement. Pt voiced understanding.

## 2022-10-03 LAB
ANION GAP SERPL CALCULATED.3IONS-SCNC: 14 MMOL/L (ref 9–17)
BUN BLDV-MCNC: 6 MG/DL (ref 6–20)
CALCIUM SERPL-MCNC: 9.4 MG/DL (ref 8.6–10.4)
CHLORIDE BLD-SCNC: 99 MMOL/L (ref 98–107)
CO2: 24 MMOL/L (ref 20–31)
CREAT SERPL-MCNC: 0.72 MG/DL (ref 0.7–1.2)
GFR AFRICAN AMERICAN: >60 ML/MIN
GFR NON-AFRICAN AMERICAN: >60 ML/MIN
GFR SERPL CREATININE-BSD FRML MDRD: ABNORMAL ML/MIN/{1.73_M2}
GLUCOSE BLD-MCNC: 90 MG/DL (ref 70–99)
HCT VFR BLD CALC: 39.9 % (ref 41–53)
HEMOGLOBIN: 13.6 G/DL (ref 13.5–17.5)
MCH RBC QN AUTO: 30.2 PG (ref 26–34)
MCHC RBC AUTO-ENTMCNC: 34 G/DL (ref 31–37)
MCV RBC AUTO: 88.9 FL (ref 80–100)
PDW BLD-RTO: 14.6 % (ref 11.5–14.9)
PLATELET # BLD: 350 K/UL (ref 150–450)
PMV BLD AUTO: 7.6 FL (ref 6–12)
POTASSIUM SERPL-SCNC: 3.6 MMOL/L (ref 3.7–5.3)
RBC # BLD: 4.49 M/UL (ref 4.5–5.9)
SODIUM BLD-SCNC: 137 MMOL/L (ref 135–144)
WBC # BLD: 11.6 K/UL (ref 3.5–11)

## 2022-10-03 PROCEDURE — 6360000002 HC RX W HCPCS: Performed by: SURGERY

## 2022-10-03 PROCEDURE — C9113 INJ PANTOPRAZOLE SODIUM, VIA: HCPCS | Performed by: SURGERY

## 2022-10-03 PROCEDURE — 85027 COMPLETE CBC AUTOMATED: CPT

## 2022-10-03 PROCEDURE — 1200000000 HC SEMI PRIVATE

## 2022-10-03 PROCEDURE — 36415 COLL VENOUS BLD VENIPUNCTURE: CPT

## 2022-10-03 PROCEDURE — 80048 BASIC METABOLIC PNL TOTAL CA: CPT

## 2022-10-03 PROCEDURE — 2580000003 HC RX 258: Performed by: SURGERY

## 2022-10-03 PROCEDURE — A4216 STERILE WATER/SALINE, 10 ML: HCPCS | Performed by: SURGERY

## 2022-10-03 RX ADMIN — KETOROLAC TROMETHAMINE 15 MG: 30 INJECTION, SOLUTION INTRAMUSCULAR at 02:41

## 2022-10-03 RX ADMIN — ENOXAPARIN SODIUM 40 MG: 100 INJECTION SUBCUTANEOUS at 09:15

## 2022-10-03 RX ADMIN — ONDANSETRON 4 MG: 2 INJECTION INTRAMUSCULAR; INTRAVENOUS at 19:27

## 2022-10-03 RX ADMIN — SODIUM CHLORIDE, PRESERVATIVE FREE 10 ML: 5 INJECTION INTRAVENOUS at 20:51

## 2022-10-03 RX ADMIN — METOCLOPRAMIDE 10 MG: 5 INJECTION, SOLUTION INTRAMUSCULAR; INTRAVENOUS at 00:43

## 2022-10-03 RX ADMIN — PANTOPRAZOLE SODIUM 40 MG: 40 INJECTION, POWDER, FOR SOLUTION INTRAVENOUS at 09:13

## 2022-10-03 ASSESSMENT — PAIN SCALES - GENERAL
PAINLEVEL_OUTOF10: 2
PAINLEVEL_OUTOF10: 0

## 2022-10-03 ASSESSMENT — PAIN DESCRIPTION - DESCRIPTORS: DESCRIPTORS: ACHING

## 2022-10-03 NOTE — PROGRESS NOTES
[address] [facility]   OCCUPATIONAL THERAPY MISSED TREATMENT NOTE   INPATIENT   Date: 10/3/22  Patient Name: Keren Kidney       Room: 5009/2204-93  MRN: [id number]   Account #: [de-identified]    : 1990  (32 y.o.)  Gender: male   Referring Practitioner: Agnieszka Tang MD             REASON FOR MISSED TREATMENT:  10/3/22    -   OT being discontinued at this time. Patient functioning at Premorbid Level  No further needs . Pt has met goals and demonstrated safety and independence with self-care as well as functional mobility. Pt with no concerns with ability to complete self-care tasks upon d/c. D/C occupational therapy services.     1520      Electronically signed by GLORIA Monaco on 10/3/2022 at 3:20 PM

## 2022-10-03 NOTE — CARE COORDINATION
ONGOING DISCHARGE PLAN:    Patient is alert and oriented x4. Spoke with patient regarding discharge plan and patient confirms that plan is still to DC to home w/ Family w/ no needs. Pt. States, Malcolm Sen has very good family support\". Pt. Is POD #3, Sigmoid Colectomy w/ Primary Anastomosis. Cl Liquid Diet. Has Dr. Alicia Dye apt on 10/12/22 at 8:30 AM.     Plans per Surgery. Will continue to follow for additional discharge needs.     Electronically signed by Grace Martinez RN on 10/3/2022 at 12:20 PM

## 2022-10-03 NOTE — PLAN OF CARE
Problem: Discharge Planning  Goal: Discharge to home or other facility with appropriate resources  10/3/2022 1605 by Bright Tse RN  Outcome: Progressing  10/3/2022 0327 by Anum Ann RN  Outcome: Progressing  Flowsheets (Taken 10/3/2022 0327)  Discharge to home or other facility with appropriate resources: Identify discharge learning needs (meds, wound care, etc)     Problem: Pain  Goal: Verbalizes/displays adequate comfort level or baseline comfort level  10/3/2022 1605 by Bright Tse RN  Outcome: Progressing  10/3/2022 0327 by Anum Ann RN  Outcome: Progressing  Flowsheets (Taken 10/3/2022 0327)  Verbalizes/displays adequate comfort level or baseline comfort level:   Encourage patient to monitor pain and request assistance   Assess pain using appropriate pain scale   Administer analgesics based on type and severity of pain and evaluate response   Implement non-pharmacological measures as appropriate and evaluate response     Problem: Safety - Adult  Goal: Free from fall injury  10/3/2022 1605 by Bright Tse RN  Outcome: Progressing  Flowsheets (Taken 10/3/2022 0923)  Free From Fall Injury: Instruct family/caregiver on patient safety  10/3/2022 0327 by Anum Ann RN  Outcome: Progressing  Flowsheets (Taken 10/3/2022 0327)  Free From Fall Injury: Instruct family/caregiver on patient safety     Problem: ABCDS Injury Assessment  Goal: Absence of physical injury  10/3/2022 1605 by Bright Tse RN  Outcome: Progressing  Flowsheets (Taken 10/3/2022 0923)  Absence of Physical Injury: Implement safety measures based on patient assessment  10/3/2022 0327 by Anum Ann RN  Outcome: Progressing  Flowsheets (Taken 10/2/2022 1744 by Scott Harris RN)  Absence of Physical Injury: Implement safety measures based on patient assessment

## 2022-10-03 NOTE — DISCHARGE INSTRUCTIONS
Dr. López Dadpolly Orders for Outpatient    1.) Diet:    [x]  As tolerated  []  Special     2.) Activity:    [x]  No lifting more than five to ten pounds 4-6 weeks  [x]  May Shower tomorrow  [x]  No driving until off pain medications     3.) Dressing:    [x]  Remove top dressing in 48 hours   [x]  Leave steri strips on     [x]  Remove and change dressing sooner if soaked. Monitor drain output and color twice daily. 4.) Medication:    [x]  Take medication as prescribed   []  Resume blood thinners in  days    [x]  Resume home medications per AVS Summary    5.) Office visit: Follow up with Dr. Oneida Solorio. Call to schedule an appointment if one has not been made. 6.) Call [phone] if you have any questions or concerns.     Electronically signed by Peña Rice MD on 10/3/2022 at 5:54 PM

## 2022-10-03 NOTE — DISCHARGE SUMMARY
Physician Discharge Summary     Date of admission: 9/30/2022    Discharge date: 10/3/2022    Admission Diagnosis: Perforation of sigmoid colon due to diverticulitis [K57.20]  Abscess of sigmoid colon due to diverticulitis [K57.20]    Discharge Diagnosis: Sigmoid diverticulitis with abscess status post sigmoid colectomy    Brief Hospitalization Details:  Rivera Morgan is a 32 y.o. male who was admitted for the management of Abscess of sigmoid colon due to diverticulitis    61-year-old gentleman status post sigmoid colectomy for complicated sigmoid diverticulitis did very well postoperatively. Pain is controlled. Bowel function returned. Diet will be advanced. Patient will be discharged home tomorrow in a stable condition. Discharge instructions discussed with the patient and the family at length. Prescriptions called in. Discharge instructions in the chart. Current Discharge Medication List        START taking these medications    Details   cephALEXin (KEFLEX) 500 MG capsule 500 mgTake three times daily  Qty: 21 capsule, Refills: 0      ondansetron (ZOFRAN) 4 MG tablet Take 1 tablet by mouth daily as needed for Nausea or Vomiting  Qty: 30 tablet, Refills: 0      oxyCODONE-acetaminophen (PERCOCET) 5-325 MG per tablet Take 1 tablet by mouth every 6 hours as needed for Pain for up to 7 days.  . Take lowest dose possible to manage pain  Qty: 28 tablet, Refills: 0    Comments: Reduce doses taken as pain becomes manageable  Associated Diagnoses: Perforation of sigmoid colon due to diverticulitis           CONTINUE these medications which have NOT CHANGED    Details   erythromycin base (E-MYCIN) 500 MG tablet Take by mouth once      neomycin (MYCIFRADIN) 500 MG tablet once      valACYclovir (VALTREX) 1 g tablet Take 1,000 mg by mouth as needed Not currently taking 9/16/22             Condition at Discharge: good    Electronically signed by Nagi Camara MD on 10/3/2022 at 5:52 PM

## 2022-10-03 NOTE — PLAN OF CARE
Problem: Discharge Planning  Goal: Discharge to home or other facility with appropriate resources  10/3/2022 0327 by Nima Cook RN  Outcome: Progressing  Flowsheets (Taken 10/3/2022 0327)  Discharge to home or other facility with appropriate resources: Identify discharge learning needs (meds, wound care, etc)  10/2/2022 1744 by Pau Siu RN  Outcome: Progressing  10/2/2022 1638 by Thai Edmonds RN  Outcome: Progressing  Flowsheets (Taken 10/2/2022 1638)  Discharge to home or other facility with appropriate resources:   Identify barriers to discharge with patient and caregiver   Arrange for needed discharge resources and transportation as appropriate   Identify discharge learning needs (meds, wound care, etc)   Arrange for interpreters to assist at discharge as needed   Refer to discharge planning if patient needs post-hospital services based on physician order or complex needs related to functional status, cognitive ability or social support system  Note: Discharge planners following for further discharge needs      Problem: Pain  Goal: Verbalizes/displays adequate comfort level or baseline comfort level  10/3/2022 0327 by Nima Cook RN  Outcome: Progressing  Flowsheets (Taken 10/3/2022 0327)  Verbalizes/displays adequate comfort level or baseline comfort level:   Encourage patient to monitor pain and request assistance   Assess pain using appropriate pain scale   Administer analgesics based on type and severity of pain and evaluate response   Implement non-pharmacological measures as appropriate and evaluate response  10/2/2022 1744 by Pau Siu RN  Outcome: Progressing  Flowsheets (Taken 10/2/2022 1744)  Verbalizes/displays adequate comfort level or baseline comfort level:   Administer analgesics based on type and severity of pain and evaluate response   Assess pain using appropriate pain scale   Encourage patient to monitor pain and request assistance   Implement non-pharmacological measures as appropriate and evaluate response   Consider cultural and social influences on pain and pain management   Notify Licensed Independent Practitioner if interventions unsuccessful or patient reports new pain  Note: PT. Received pain meds in timely manner. Teach pt. Non-pharm. Methods to handle pain. 10/2/2022 1638 by Renetta Alex RN  Outcome: Progressing  Flowsheets (Taken 10/2/2022 1638)  Verbalizes/displays adequate comfort level or baseline comfort level:   Encourage patient to monitor pain and request assistance   Assess pain using appropriate pain scale   Administer analgesics based on type and severity of pain and evaluate response   Implement non-pharmacological measures as appropriate and evaluate response   Consider cultural and social influences on pain and pain management  Note: Patients pain level decreased with prescribed pain medications. Problem: Safety - Adult  Goal: Free from fall injury  10/3/2022 0327 by Pravin Thomas RN  Outcome: Progressing  Flowsheets (Taken 10/3/2022 0327)  Free From Fall Injury: Instruct family/caregiver on patient safety  10/2/2022 1744 by Aston Wills RN  Outcome: Progressing  Flowsheets (Taken 10/2/2022 1744)  Free From Fall Injury:   Instruct family/caregiver on patient safety   Based on caregiver fall risk screen, instruct family/caregiver to ask for assistance with transferring infant if caregiver noted to have fall risk factors  Note: Made sure call light was in reach, a clear pathway and adequate lighting was provided. Also made sure that the pt. Was wearing non-slip socks. 10/2/2022 1638 by Renetta Alex RN  Outcome: Progressing  Flowsheets (Taken 10/2/2022 1638)  Free From Fall Injury:   Reno Garcia family/caregiver on patient safety   Based on caregiver fall risk screen, instruct family/caregiver to ask for assistance with transferring infant if caregiver noted to have fall risk factors  Note: No falls this shift.   Call light with in reach, side rails up x2, bed in lowest postion. Patients safety maintained. Problem: ABCDS Injury Assessment  Goal: Absence of physical injury  10/3/2022 0327 by Octavio Suazo RN  Outcome: Progressing  Flowsheets (Taken 10/2/2022 1744 by Mely Crawford, RN)  Absence of Physical Injury: Implement safety measures based on patient assessment  10/2/2022 1744 by Mely Crawford, RN  Outcome: Progressing  Flowsheets (Taken 10/2/2022 1744)  Absence of Physical Injury: Implement safety measures based on patient assessment  Note: Notify pt.of possible side effect of meds. Make sure pt. Know to call out before getting up if needs assistance. 10/2/2022 1638 by Stewart Barrera RN  Outcome: Progressing  Flowsheets (Taken 10/2/2022 1638)  Absence of Physical Injury: Implement safety measures based on patient assessment  Note: No injury this shift. Patients safety maintained.

## 2022-10-03 NOTE — PROGRESS NOTES
Physical Therapy  DATE: 10/3/2022    NAME: Yessica Valentine  MRN: [id number]   : 1990    Patient not seen this date for Physical Therapy due to:      [] Cancel by RN or physician due to:    [] Hemodialysis    [] Critical Lab Value Level     [] Blood transfusion in progress    [] Acute or unstable cardiovascular status   _MAP < 55 or more than >115  _HR < 40 or > 130    [] Acute or unstable pulmonary status   -FiO2 > 60%   _RR < 5 or >40    _O2 sats < 85%    [] Strict Bedrest    [] Off Unit for surgery or procedure    [] Off Unit for testing       [] Pending imaging to R/O fracture    [] Refusal by Patient      [] Other      [x] PT being discontinued at this time. Patient independent. No further needs. ; checked with patient @ [phone]. Patient observed independently walking unit multiple times this date. Patient demonstrated good balance. [] PT being discontinued at this time as the patient has been transferred to hospice care. No further needs.       Electronically signed by Jone Hamm PTA on 10/3/22 at 2:49 PM EDT

## 2022-10-04 VITALS
BODY MASS INDEX: 27.77 KG/M2 | WEIGHT: 194 LBS | TEMPERATURE: 98.6 F | DIASTOLIC BLOOD PRESSURE: 97 MMHG | RESPIRATION RATE: 18 BRPM | OXYGEN SATURATION: 98 % | HEART RATE: 73 BPM | HEIGHT: 70 IN | SYSTOLIC BLOOD PRESSURE: 152 MMHG

## 2022-10-04 LAB
ANION GAP SERPL CALCULATED.3IONS-SCNC: 11 MMOL/L (ref 9–17)
BUN BLDV-MCNC: 7 MG/DL (ref 6–20)
CALCIUM SERPL-MCNC: 9.2 MG/DL (ref 8.6–10.4)
CHLORIDE BLD-SCNC: 102 MMOL/L (ref 98–107)
CO2: 25 MMOL/L (ref 20–31)
CREAT SERPL-MCNC: 0.7 MG/DL (ref 0.7–1.2)
GFR SERPL CREATININE-BSD FRML MDRD: >60 ML/MIN/1.73M2
GLUCOSE BLD-MCNC: 105 MG/DL (ref 70–99)
HCT VFR BLD CALC: 37.7 % (ref 41–53)
HEMOGLOBIN: 13.2 G/DL (ref 13.5–17.5)
MCH RBC QN AUTO: 30.2 PG (ref 26–34)
MCHC RBC AUTO-ENTMCNC: 35 G/DL (ref 31–37)
MCV RBC AUTO: 86.3 FL (ref 80–100)
PDW BLD-RTO: 14.1 % (ref 11.5–14.9)
PLATELET # BLD: 297 K/UL (ref 150–450)
PMV BLD AUTO: 7.5 FL (ref 6–12)
POTASSIUM SERPL-SCNC: 3.6 MMOL/L (ref 3.7–5.3)
RBC # BLD: 4.37 M/UL (ref 4.5–5.9)
SODIUM BLD-SCNC: 138 MMOL/L (ref 135–144)
SURGICAL PATHOLOGY REPORT: NORMAL
WBC # BLD: 9.1 K/UL (ref 3.5–11)

## 2022-10-04 PROCEDURE — 80048 BASIC METABOLIC PNL TOTAL CA: CPT

## 2022-10-04 PROCEDURE — 85027 COMPLETE CBC AUTOMATED: CPT

## 2022-10-04 PROCEDURE — 36415 COLL VENOUS BLD VENIPUNCTURE: CPT

## 2022-10-04 PROCEDURE — 6360000002 HC RX W HCPCS: Performed by: SURGERY

## 2022-10-04 RX ADMIN — ENOXAPARIN SODIUM 40 MG: 100 INJECTION SUBCUTANEOUS at 08:40

## 2022-10-04 RX ADMIN — ONDANSETRON 4 MG: 2 INJECTION INTRAMUSCULAR; INTRAVENOUS at 03:09

## 2022-10-04 NOTE — PROGRESS NOTES
Nutrition Education    Educated on Low Fiber Diet  Learners: Patient  Readiness: Acceptance  Method: Explanation and Handout  Response: Verbalizes Understanding  Contact name and number provided.     Aliza Roper RD, LD  Contact Number: [phone]

## 2022-10-04 NOTE — PLAN OF CARE
Problem: Discharge Planning  Goal: Discharge to home or other facility with appropriate resources  10/4/2022 1053 by All Talbot RN  Outcome: Completed  10/4/2022 0329 by Ling Huertas RN  Outcome: Progressing  Flowsheets (Taken 10/3/2022 0924 by All Talbot RN)  Discharge to home or other facility with appropriate resources:   Identify barriers to discharge with patient and caregiver   Arrange for needed discharge resources and transportation as appropriate   Identify discharge learning needs (meds, wound care, etc)     Problem: Pain  Goal: Verbalizes/displays adequate comfort level or baseline comfort level  10/4/2022 1053 by All Talbot RN  Outcome: Completed  10/4/2022 0329 by Ling Huertas RN  Outcome: Progressing  Flowsheets (Taken 10/3/2022 0327)  Verbalizes/displays adequate comfort level or baseline comfort level:   Encourage patient to monitor pain and request assistance   Assess pain using appropriate pain scale   Administer analgesics based on type and severity of pain and evaluate response   Implement non-pharmacological measures as appropriate and evaluate response     Problem: Safety - Adult  Goal: Free from fall injury  10/4/2022 1053 by All Talbot RN  Outcome: Completed  10/4/2022 0329 by Ling Huertas RN  Outcome: Progressing  Flowsheets (Taken 10/3/2022 0923 by All Talbot RN)  Free From Fall Injury: Instruct family/caregiver on patient safety     Problem: ABCDS Injury Assessment  Goal: Absence of physical injury  10/4/2022 1053 by All Tlabot RN  Outcome: Completed  10/4/2022 0329 by Ling Huertas RN  Outcome: Progressing  Flowsheets (Taken 10/3/2022 0923 by All Talbot RN)  Absence of Physical Injury: Implement safety measures based on patient assessment

## 2022-10-04 NOTE — PROGRESS NOTES
Pt spouse in the room with pt, RN told them visiting hrs lapsed. Pt says he is getting discharged tomorrow and doesn't have money to put gas in the car to and from home and would like his spouse to remain tonight.  RN called house supervisor, ok to stay tonight and need to be out in AM. RN updated pt, verbalize understanding

## 2022-10-04 NOTE — CARE COORDINATION
ONGOING DISCHARGE PLAN:    Patient is alert and oriented x4. Spoke with patient regarding discharge plan and patient confirms that plan is still to go home with no needs. POD#4  sigmoid colectomy     Hopeful for home today. Will continue to follow for additional discharge needs.     Electronically signed by Lee Fishman RN on 10/4/2022 at 12:32 PM

## 2022-10-04 NOTE — PLAN OF CARE
Problem: Discharge Planning  Goal: Discharge to home or other facility with appropriate resources  10/4/2022 0329 by Octavio Suazo RN  Outcome: Progressing  Flowsheets (Taken 10/3/2022 0924 by Nancy Allen RN)  Discharge to home or other facility with appropriate resources:   Identify barriers to discharge with patient and caregiver   Arrange for needed discharge resources and transportation as appropriate   Identify discharge learning needs (meds, wound care, etc)  10/3/2022 1605 by Nancy Allen RN  Outcome: Progressing  Flowsheets (Taken 10/3/2022 0924)  Discharge to home or other facility with appropriate resources:   Identify barriers to discharge with patient and caregiver   Arrange for needed discharge resources and transportation as appropriate   Identify discharge learning needs (meds, wound care, etc)     Problem: Pain  Goal: Verbalizes/displays adequate comfort level or baseline comfort level  10/4/2022 0329 by Octavio Suazo RN  Outcome: Progressing  Flowsheets (Taken 10/3/2022 0327)  Verbalizes/displays adequate comfort level or baseline comfort level:   Encourage patient to monitor pain and request assistance   Assess pain using appropriate pain scale   Administer analgesics based on type and severity of pain and evaluate response   Implement non-pharmacological measures as appropriate and evaluate response  10/3/2022 1605 by Nancy Allen RN  Outcome: Progressing     Problem: Safety - Adult  Goal: Free from fall injury  10/4/2022 0329 by Octavio Suazo RN  Outcome: Progressing  Flowsheets (Taken 10/3/2022 0923 by Nancy Allen RN)  Free From Fall Injury: Instruct family/caregiver on patient safety  10/3/2022 1605 by Nancy Allen RN  Outcome: Progressing  Flowsheets (Taken 10/3/2022 0923)  Free From Fall Injury: Instruct family/caregiver on patient safety     Problem: ABCDS Injury Assessment  Goal: Absence of physical injury  10/4/2022 0329 by Octavio Suazo RN  Outcome: Progressing  Flowsheets (Taken 10/3/2022 9718 by Ricci Ledezma, RN)  Absence of Physical Injury: Implement safety measures based on patient assessment  10/3/2022 1605 by Ricci Ledezma, RN  Outcome: Progressing  Flowsheets (Taken 10/3/2022 0923)  Absence of Physical Injury: Implement safety measures based on patient assessment

## 2022-10-04 NOTE — PROGRESS NOTES
CLINICAL PHARMACY NOTE: MEDS TO BEDS    Total # of Prescriptions Filled: 3   The following medications were delivered to the patient:  Oxycodone 5/325  Ondansetron HCL4mg  Cephalexin 500mg    Additional Documentation:  Medications picked up by patient

## 2022-10-04 NOTE — PROGRESS NOTES
Doc thusay rounded on pt stated pt can advance diet as tolerated at home pt is being discharged will remove his iv and provided them with dc instructions.

## 2022-10-04 NOTE — PROGRESS NOTES
Patient walking in corridor with his significant other, very excited about being discharged home today. Patient grateful for the support and care from staff and welcome prayer this morning. Patient report having good support from S.O and family members. 10/04/22 7340   Encounter Summary   Encounter Overview/Reason  Spiritual/Emotional Needs   Service Provided For: Patient;Significant other   Referral/Consult From: [facility]   Support System Significant other;Family members   Last Encounter  10/04/22   Complexity of Encounter Low   Spiritual/Emotional needs   Type Spiritual Support   Assessment/Intervention/Outcome   Assessment Calm;Coping;Peaceful   Intervention Active listening;Discussed illness injury and its impact; Explored/Affirmed feelings, thoughts, concerns;Prayer (assurance of)/[place];Nurtured Hope   Outcome Acceptance; Coping;Engaged in conversation;Expressed Gratitude;Receptive

## 2022-10-16 PROBLEM — Z01.818 PREOP EXAMINATION: Status: RESOLVED | Noted: 2022-09-16 | Resolved: 2022-10-16

## 2022-10-17 PROBLEM — B00.9 HSV (HERPES SIMPLEX VIRUS) INFECTION: Status: ACTIVE | Noted: 2022-10-17

## 2022-10-17 PROBLEM — D72.829 LEUKOCYTOSIS: Status: ACTIVE | Noted: 2022-10-17

## 2023-08-28 ENCOUNTER — HOSPITAL ENCOUNTER (OUTPATIENT)
Dept: PREADMISSION TESTING | Age: 33
Discharge: HOME | End: 2023-09-01

## 2023-08-28 VITALS — WEIGHT: 230 LBS | HEIGHT: 70 IN | BODY MASS INDEX: 32.93 KG/M2

## 2023-08-28 NOTE — PROGRESS NOTES
Pre-op Instructions For Out-Patient Endoscopy Surgery    Medication Instructions:  Please stop herbs and any supplements now (includes vitamins and minerals). Please contact your surgeon and prescribing physician for pre-op instructions for any blood thinners. If you have inhalers/aerosol treatments at home, please use them the morning of your surgery and bring the inhalers with you to the hospital.    Please take the following medications the morning of your surgery with a sip of water:    none    Surgery Instructions:  After midnight before surgery:  Do not eat or drink anything, including water, mints, gum, and hard candy. You may brush your teeth without swallowing. No smoking, chewing tobacco, or street drugs. Please shower or bathe before surgery. Please do not wear any cologne, lotion, powder, jewelry, piercings, perfume, makeup, nail polish, hair accessories, or hair spray on the day of surgery. Wear loose comfortable clothing. Leave your valuables at home but bring a payment source for any after-surgery prescriptions you plan to fill at [facility]. Bring a storage case for any glasses/contacts. An adult who is responsible for you MUST drive you home and should be with you for the first 24 hours after surgery. The Day of Surgery:  Arrive at [address] [facility] at the time directed by your surgeon and check in at the desk. If you have a living will or healthcare power of , please bring a copy. You will be taken to the pre-op holding area where you will be prepared for surgery. A physical assessment will be performed by a nurse practitioner or house officer. Your IV will be started and you will meet your anesthesiologist.    When you go to surgery, your family will be directed to the surgical waiting room, where the doctor should speak with them after your surgery. After surgery, you will be taken to the recovery area.   When you

## 2023-08-31 NOTE — PRE-PROCEDURE INSTRUCTIONS
Have you received your Prep? yesAny questions with prep instructions?no  Only Clear Liquid Diet day before. yes  Nothing to eat after midnight day before procedure. yes  Are you taking any blood thinners? noIf so, you need to Stop. Remove any jewelry and body piercings. yes  Do you wear glasses? If so, please bring a case to store them in. Are you having any Covid symptoms?no  Do you have any new rashes, infections, etc. that we should be aware of?no  Do you have a ride home the day of surgery? yesIt cannot be a cab or medical transportation.   Verify surgery time/date and what time to arrive at hospital. 0530/0700

## 2023-09-01 ENCOUNTER — ANESTHESIA EVENT (OUTPATIENT)
Dept: ENDOSCOPY | Age: 33
End: 2023-09-01
Payer: COMMERCIAL

## 2023-09-05 ENCOUNTER — HOSPITAL ENCOUNTER (OUTPATIENT)
Age: 33
Setting detail: OUTPATIENT SURGERY
Discharge: HOME | End: 2023-09-05
Attending: SURGERY | Admitting: SURGERY
Payer: COMMERCIAL

## 2023-09-05 ENCOUNTER — ANESTHESIA (OUTPATIENT)
Dept: ENDOSCOPY | Age: 33
End: 2023-09-05
Payer: COMMERCIAL

## 2023-09-05 VITALS
HEIGHT: 70 IN | TEMPERATURE: 97.3 F | OXYGEN SATURATION: 97 % | SYSTOLIC BLOOD PRESSURE: 137 MMHG | DIASTOLIC BLOOD PRESSURE: 79 MMHG | BODY MASS INDEX: 32.93 KG/M2 | WEIGHT: 230 LBS | RESPIRATION RATE: 14 BRPM | HEART RATE: 84 BPM

## 2023-09-05 DIAGNOSIS — Z87.19 HISTORY OF DIVERTICULITIS: ICD-10-CM

## 2023-09-05 PROCEDURE — 2500000003 HC RX 250 WO HCPCS: Performed by: NURSE ANESTHETIST, CERTIFIED REGISTERED

## 2023-09-05 PROCEDURE — 2500000003 HC RX 250 WO HCPCS: Performed by: ANESTHESIOLOGY

## 2023-09-05 PROCEDURE — 7100000001 HC PACU RECOVERY - ADDTL 15 MIN: Performed by: SURGERY

## 2023-09-05 PROCEDURE — 3700000000 HC ANESTHESIA ATTENDED CARE: Performed by: SURGERY

## 2023-09-05 PROCEDURE — 3700000001 HC ADD 15 MINUTES (ANESTHESIA): Performed by: SURGERY

## 2023-09-05 PROCEDURE — 2580000003 HC RX 258: Performed by: ANESTHESIOLOGY

## 2023-09-05 PROCEDURE — 7100000030 HC ASPR PHASE II RECOVERY - FIRST 15 MIN: Performed by: SURGERY

## 2023-09-05 PROCEDURE — 3609010300 HC COLONOSCOPY W/BIOPSY SINGLE/MULTIPLE: Performed by: SURGERY

## 2023-09-05 PROCEDURE — 7100000011 HC PHASE II RECOVERY - ADDTL 15 MIN: Performed by: SURGERY

## 2023-09-05 PROCEDURE — 7100000031 HC ASPR PHASE II RECOVERY - ADDTL 15 MIN: Performed by: SURGERY

## 2023-09-05 PROCEDURE — 7100000000 HC PACU RECOVERY - FIRST 15 MIN: Performed by: SURGERY

## 2023-09-05 PROCEDURE — 88305 TISSUE EXAM BY PATHOLOGIST: CPT

## 2023-09-05 PROCEDURE — 2709999900 HC NON-CHARGEABLE SUPPLY: Performed by: SURGERY

## 2023-09-05 PROCEDURE — 6360000002 HC RX W HCPCS: Performed by: NURSE ANESTHETIST, CERTIFIED REGISTERED

## 2023-09-05 PROCEDURE — 7100000010 HC PHASE II RECOVERY - FIRST 15 MIN: Performed by: SURGERY

## 2023-09-05 RX ORDER — SODIUM CHLORIDE 0.9 % (FLUSH) 0.9 %
5-40 SYRINGE (ML) INJECTION PRN
Status: DISCONTINUED | OUTPATIENT
Start: 2023-09-05 | End: 2023-09-05 | Stop reason: HOSPADM

## 2023-09-05 RX ORDER — SODIUM CHLORIDE 0.9 % (FLUSH) 0.9 %
5-40 SYRINGE (ML) INJECTION EVERY 12 HOURS SCHEDULED
Status: DISCONTINUED | OUTPATIENT
Start: 2023-09-05 | End: 2023-09-05 | Stop reason: HOSPADM

## 2023-09-05 RX ORDER — LIDOCAINE HYDROCHLORIDE 10 MG/ML
1 INJECTION, SOLUTION EPIDURAL; INFILTRATION; INTRACAUDAL; PERINEURAL
Status: COMPLETED | OUTPATIENT
Start: 2023-09-05 | End: 2023-09-05

## 2023-09-05 RX ORDER — SODIUM CHLORIDE, SODIUM LACTATE, POTASSIUM CHLORIDE, CALCIUM CHLORIDE 600; 310; 30; 20 MG/100ML; MG/100ML; MG/100ML; MG/100ML
INJECTION, SOLUTION INTRAVENOUS CONTINUOUS
Status: DISCONTINUED | OUTPATIENT
Start: 2023-09-05 | End: 2023-09-05 | Stop reason: HOSPADM

## 2023-09-05 RX ORDER — LIDOCAINE HYDROCHLORIDE 20 MG/ML
INJECTION, SOLUTION EPIDURAL; INFILTRATION; INTRACAUDAL; PERINEURAL PRN
Status: DISCONTINUED | OUTPATIENT
Start: 2023-09-05 | End: 2023-09-05 | Stop reason: SDUPTHER

## 2023-09-05 RX ORDER — PROPOFOL 10 MG/ML
INJECTION, EMULSION INTRAVENOUS PRN
Status: DISCONTINUED | OUTPATIENT
Start: 2023-09-05 | End: 2023-09-05 | Stop reason: SDUPTHER

## 2023-09-05 RX ORDER — ONDANSETRON 2 MG/ML
INJECTION INTRAMUSCULAR; INTRAVENOUS PRN
Status: DISCONTINUED | OUTPATIENT
Start: 2023-09-05 | End: 2023-09-05 | Stop reason: SDUPTHER

## 2023-09-05 RX ORDER — DIPHENHYDRAMINE HYDROCHLORIDE 50 MG/ML
12.5 INJECTION INTRAMUSCULAR; INTRAVENOUS
Status: DISCONTINUED | OUTPATIENT
Start: 2023-09-05 | End: 2023-09-05 | Stop reason: HOSPADM

## 2023-09-05 RX ORDER — DEXAMETHASONE SODIUM PHOSPHATE 4 MG/ML
INJECTION, SOLUTION INTRA-ARTICULAR; INTRALESIONAL; INTRAMUSCULAR; INTRAVENOUS; SOFT TISSUE PRN
Status: DISCONTINUED | OUTPATIENT
Start: 2023-09-05 | End: 2023-09-05 | Stop reason: SDUPTHER

## 2023-09-05 RX ORDER — ONDANSETRON 2 MG/ML
4 INJECTION INTRAMUSCULAR; INTRAVENOUS
Status: DISCONTINUED | OUTPATIENT
Start: 2023-09-05 | End: 2023-09-05 | Stop reason: HOSPADM

## 2023-09-05 RX ORDER — SODIUM CHLORIDE 9 MG/ML
INJECTION, SOLUTION INTRAVENOUS PRN
Status: DISCONTINUED | OUTPATIENT
Start: 2023-09-05 | End: 2023-09-05 | Stop reason: HOSPADM

## 2023-09-05 RX ADMIN — DEXAMETHASONE SODIUM PHOSPHATE 4 MG: 4 INJECTION INTRA-ARTICULAR; INTRALESIONAL; INTRAMUSCULAR; INTRAVENOUS; SOFT TISSUE at 07:10

## 2023-09-05 RX ADMIN — ONDANSETRON 4 MG: 2 INJECTION INTRAMUSCULAR; INTRAVENOUS at 07:10

## 2023-09-05 RX ADMIN — SODIUM CHLORIDE, POTASSIUM CHLORIDE, SODIUM LACTATE AND CALCIUM CHLORIDE: 600; 310; 30; 20 INJECTION, SOLUTION INTRAVENOUS at 06:10

## 2023-09-05 RX ADMIN — LIDOCAINE HYDROCHLORIDE 100 MG: 20 INJECTION, SOLUTION EPIDURAL; INFILTRATION; INTRACAUDAL; PERINEURAL at 07:10

## 2023-09-05 RX ADMIN — PROPOFOL 200 MG: 10 INJECTION, EMULSION INTRAVENOUS at 07:10

## 2023-09-05 RX ADMIN — LIDOCAINE HYDROCHLORIDE 1 ML: 10 INJECTION, SOLUTION EPIDURAL; INFILTRATION; INTRACAUDAL; PERINEURAL at 06:09

## 2023-09-05 ASSESSMENT — PAIN - FUNCTIONAL ASSESSMENT: PAIN_FUNCTIONAL_ASSESSMENT: 0-10

## 2023-09-05 ASSESSMENT — PAIN SCALES - GENERAL
PAINLEVEL_OUTOF10: 0

## 2023-09-05 ASSESSMENT — ENCOUNTER SYMPTOMS
SORE THROAT: 0
BACK PAIN: 0
SHORTNESS OF BREATH: 0

## 2023-09-05 NOTE — OP NOTE
Patient: Lorena Amato  YOB: 1990  MRN: [id number]    Date of Procedure: 9/5/2023  PROCEDURE NOTE    DATE OF PROCEDURE: 9/5/2023    SURGEON: Adriane Whitfield MD    ASSISTANT: None    PREOPERATIVE DIAGNOSIS: History of diverticulitis status post sigmoid resection    POSTOPERATIVE DIAGNOSIS: Likely hyperplastic rectal polyp. Patent anastomosis previous sigmoid colectomy. Few left-sided diverticula. No diverticulitis. OPERATION: Total colonoscopy to cecum with intubation of terminal ileum. Rectal polypectomy with cold biopsy forceps    ANESTHESIA: General    ESTIMATED BLOOD LOSS: None    COMPLICATIONS: None     SPECIMENS:  Was Obtained: Rectal polyp    HISTORY: The patient is a 28y.o. year old male with history of above preop diagnosis. I recommended colonoscopy with possible biopsy or polypectomy and I explained the risk, benefits, expected outcome, and alternatives to the procedure. Risks included but are not limited to bleeding, infection, respiratory distress, hypotension, and perforation of the colon and possibility of missing a lesion. The patient understands and is in agreement. PROCEDURE: The patient was given IV conscious sedation. The patient's SPO2 remained above 90% throughout the procedure. Digital rectal exam was normal.  The colonoscope was inserted through the anus into the rectum and advanced under direct vision to the cecum without difficulty. Terminal ileum was examined for approximately 2 inches. The prep was good. Findings:  Terminal ileum: normal    Cecum/Ascending colon: normal    Transverse colon: normal    Descending/Sigmoid colon: abnormal: Few diverticula left colon. Patent anastomosis previous sigmoid colectomy. Rectum/Anus: examined in normal and retroflexed positions and was abnormal: Tiny hyperplastic polyp rectum removed with cold biopsy forceps    Withdrawal Time was (minutes): 20      Next screening colonoscopy: 5 years.   If screening is less Previous Labs: No How Did The Hair Loss Occur?: sudden in onset How Severe Is Your Hair Loss?: moderate Additional History: Patient states the hair on the sides of his head is thinning out. Patient is stressed out due to work, getting  10/2023 and hes moving to [place] 10/22. No family history of thinning hair or hair loss. Denies symptoms in the scalp. Denies history of dandruff. He has not tried any treatment. Take a daily multivitamin but otherwise no medications.

## 2023-09-05 NOTE — H&P
HISTORY and [address]       NAME:  Ed Hussein  MRN: [id number]   YOB: 1990   Date: 9/5/2023   Age: 28 y.o. Gender: male       COMPLAINT AND PRESENT HISTORY:     Ed Hussein is 28 y.o.  male, here for COLONOSCOPY DIAGNOSTIC related to history of diverticulitis. Pt s/p Sigmoid colectomy with primary anastomosis. Mobilization of splenic flexure. Lysis of small bowel adhesion performed by Jennifer Akins MD in September, 2022. Denies abdominal pain, dysphagia, heartburn. Denies nausea, vomiting, diarrhea, constipation. Denies blood in stool, dark tarry stools. Denies changes in appetite and unintended weight loss. Denies family history of colon cancer. Completed and followed prescribed prep. NPO p MN. No medications taken today. Denies taking any blood thinning medications. Denies recent or current chest pain/pressure, palpitations, SOB, recent URI, fever or chills.      RECENT LABS, IMAGING AND TESTING     Lab Results   Component Value Date    WBC 9.1 10/04/2022    RBC 4.37 (L) 10/04/2022    HGB 13.2 (L) 10/04/2022    HCT 37.7 (L) 10/04/2022    MCV 86.3 10/04/2022    MCH 30.2 10/04/2022    MCHC 35.0 10/04/2022    RDW 14.1 10/04/2022     10/04/2022    MPV 7.5 10/04/2022        Lab Results   Component Value Date     10/04/2022    K 3.6 (L) 10/04/2022     10/04/2022    CO2 25 10/04/2022    BUN 7 10/04/2022    CREATININE 0.70 10/04/2022    GLUCOSE 105 (H) 10/04/2022    CALCIUM 9.2 10/04/2022    PROT 7.7 09/16/2022    LABALBU 4.8 09/16/2022    BILITOT 0.8 09/16/2022    ALKPHOS 59 09/16/2022    AST 18 09/16/2022    ALT 22 09/16/2022         PAST MEDICAL HISTORY     Past Medical History:   Diagnosis Date    Diverticulitis     HSV (herpes simplex virus) infection        SURGICAL HISTORY       Past Surgical History:   Procedure Laterality Date    COLONOSCOPY N/A 9/27/2022    COLONOSCOPY DIAGNOSTIC performed by Jennifer Akins MD at [address] [facility] [address]

## 2023-09-07 LAB — SURGICAL PATHOLOGY REPORT: NORMAL

## 2024-01-08 ENCOUNTER — OFFICE VISIT (OUTPATIENT)
Dept: FAMILY MEDICINE CLINIC | Age: 34
End: 2024-01-08
Payer: COMMERCIAL

## 2024-01-08 ENCOUNTER — TELEPHONE (OUTPATIENT)
Dept: FAMILY MEDICINE CLINIC | Age: 34
End: 2024-01-08

## 2024-01-08 VITALS
HEART RATE: 108 BPM | DIASTOLIC BLOOD PRESSURE: 87 MMHG | TEMPERATURE: 97.2 F | SYSTOLIC BLOOD PRESSURE: 147 MMHG | OXYGEN SATURATION: 98 %

## 2024-01-08 DIAGNOSIS — B96.89 ACUTE BACTERIAL SINUSITIS: Primary | ICD-10-CM

## 2024-01-08 DIAGNOSIS — J01.90 ACUTE BACTERIAL SINUSITIS: Primary | ICD-10-CM

## 2024-01-08 PROCEDURE — G8484 FLU IMMUNIZE NO ADMIN: HCPCS | Performed by: NURSE PRACTITIONER

## 2024-01-08 PROCEDURE — 1036F TOBACCO NON-USER: CPT | Performed by: NURSE PRACTITIONER

## 2024-01-08 PROCEDURE — G8417 CALC BMI ABV UP PARAM F/U: HCPCS | Performed by: NURSE PRACTITIONER

## 2024-01-08 PROCEDURE — 99213 OFFICE O/P EST LOW 20 MIN: CPT | Performed by: NURSE PRACTITIONER

## 2024-01-08 PROCEDURE — G8427 DOCREV CUR MEDS BY ELIG CLIN: HCPCS | Performed by: NURSE PRACTITIONER

## 2024-01-08 RX ORDER — CETIRIZINE HYDROCHLORIDE, PSEUDOEPHEDRINE HYDROCHLORIDE 5; 120 MG/1; MG/1
1 TABLET, FILM COATED, EXTENDED RELEASE ORAL 2 TIMES DAILY
Qty: 60 TABLET | Refills: 0 | Status: SHIPPED | OUTPATIENT
Start: 2024-01-08 | End: 2024-02-07

## 2024-01-08 RX ORDER — AMOXICILLIN AND CLAVULANATE POTASSIUM 875; 125 MG/1; MG/1
1 TABLET, FILM COATED ORAL 2 TIMES DAILY
Qty: 20 TABLET | Refills: 0 | Status: SHIPPED | OUTPATIENT
Start: 2024-01-08 | End: 2024-01-08 | Stop reason: ALTCHOICE

## 2024-01-08 RX ORDER — AMOXICILLIN 500 MG/1
500 CAPSULE ORAL 3 TIMES DAILY
Qty: 30 CAPSULE | Refills: 0 | Status: SHIPPED | OUTPATIENT
Start: 2024-01-08 | End: 2024-01-18

## 2024-01-08 ASSESSMENT — ENCOUNTER SYMPTOMS
SORE THROAT: 0
HOARSE VOICE: 0
SHORTNESS OF BREATH: 0
COUGH: 1
SINUS PRESSURE: 1
SWOLLEN GLANDS: 0

## 2024-01-08 NOTE — TELEPHONE ENCOUNTER
Patient called back, he states if he cuts them in half it'll make it worse it'll be sharp? He is able to take the zyrtec

## 2024-01-08 NOTE — TELEPHONE ENCOUNTER
Hourly rounding complete. Safety  Pt resting   [ x ]  On stretcher with side rails up and bed in locked position, call bell within reach  [  ]  Sitting in chair with casters locked, call bell within reach    Toileting  [x  ] pt denies need to use bathroom  [  ] pt assisted to bathroom  [  ] pt assisted with bedpan  [  ] pt independent to bathroom as needed    Ongoing Plan of Care  Plan of care and expected time for test and results reviewed with pt.     Pain Management / Comfort  [  ] dimmed lights  [  ] warm blanket provided  [  ] pain assessed  [ x ] monitor alarms reviewed Patient calling regarding the rx you sent to the pharmacy. Patient states the pills are too big to swallow and they are requesting if you can send in something different

## 2024-08-23 ENCOUNTER — HOSPITAL ENCOUNTER (EMERGENCY)
Age: 34
Discharge: HOME | End: 2024-08-23
Attending: EMERGENCY MEDICINE
Payer: COMMERCIAL

## 2024-08-23 ENCOUNTER — APPOINTMENT (OUTPATIENT)
Dept: CT IMAGING | Age: 34
End: 2024-08-23
Payer: COMMERCIAL

## 2024-08-23 VITALS
OXYGEN SATURATION: 100 % | SYSTOLIC BLOOD PRESSURE: 155 MMHG | TEMPERATURE: 98.7 F | HEART RATE: 96 BPM | WEIGHT: 230 LBS | BODY MASS INDEX: 32.93 KG/M2 | HEIGHT: 70 IN | DIASTOLIC BLOOD PRESSURE: 100 MMHG | RESPIRATION RATE: 14 BRPM

## 2024-08-23 DIAGNOSIS — M51.26 LUMBAR HERNIATED DISC: Primary | ICD-10-CM

## 2024-08-23 DIAGNOSIS — S39.012A LUMBOSACRAL STRAIN, INITIAL ENCOUNTER: ICD-10-CM

## 2024-08-23 PROCEDURE — 99284 EMERGENCY DEPT VISIT MOD MDM: CPT

## 2024-08-23 PROCEDURE — 6370000000 HC RX 637 (ALT 250 FOR IP): Performed by: PHYSICIAN ASSISTANT

## 2024-08-23 PROCEDURE — 72131 CT LUMBAR SPINE W/O DYE: CPT

## 2024-08-23 RX ORDER — LIDOCAINE 4 G/G
1 PATCH TOPICAL DAILY
Qty: 30 PATCH | Refills: 0 | Status: SHIPPED | OUTPATIENT
Start: 2024-08-23 | End: 2024-09-22

## 2024-08-23 RX ORDER — LIDOCAINE 4 G/G
1 PATCH TOPICAL ONCE
Status: DISCONTINUED | OUTPATIENT
Start: 2024-08-23 | End: 2024-08-23 | Stop reason: HOSPADM

## 2024-08-23 RX ORDER — PREDNISONE 20 MG/1
50 TABLET ORAL ONCE
Status: COMPLETED | OUTPATIENT
Start: 2024-08-23 | End: 2024-08-23

## 2024-08-23 RX ORDER — METHYLPREDNISOLONE 4 MG/1
TABLET ORAL
Qty: 1 KIT | Refills: 0 | Status: SHIPPED | OUTPATIENT
Start: 2024-08-23 | End: 2024-08-29

## 2024-08-23 RX ADMIN — PREDNISONE 50 MG: 20 TABLET ORAL at 17:50

## 2024-08-23 ASSESSMENT — LIFESTYLE VARIABLES
HOW MANY STANDARD DRINKS CONTAINING ALCOHOL DO YOU HAVE ON A TYPICAL DAY: PATIENT DOES NOT DRINK
HOW OFTEN DO YOU HAVE A DRINK CONTAINING ALCOHOL: NEVER

## 2024-08-23 NOTE — DISCHARGE INSTRUCTIONS
You may take tylenol and ibuprofen for pain as needed. Additionally, I am prescribing lidocaine patches and a short course of steroids. Follow-up with ortho as soon as possible.

## 2024-08-23 NOTE — ED PROVIDER NOTES
eMERGENCY dEPARTMENT eNCOUnter   Independent Attestation     Pt Name: Christopher Purvis  MRN: [id number]  Birthdate 1990  Date of evaluation: 8/23/24     Christopher Purvis is a 33 y.o. male with CC: Back Pain (Pt states he was lifting a ramp, states he must of listed wrong, and \"felt a pop\" pt states was on the verge of \"blacking out\" d/t pain but he fought it, now has been having significant pain in lumbar back area, radiates into buttocks and thighs)      Based on the medical record the care appears appropriate.  I was personally available for consultation in the Emergency Department.    Jordy Mansfield DO  Attending Emergency Physician                  Jordy Mansfield DO  08/23/24 5026    
evaluating the soft tissues and canal contents.             LABS: Lab orders shown below, the results are reviewed by myself, and all abnormals are listed below.  Labs Reviewed - No data to display    Vitals Reviewed:    Vitals:    08/23/24 1432   BP: (!) 155/100   Pulse: 96   Resp: 14   Temp: 98.7 °F (37.1 °C)   TempSrc: Oral   SpO2: 100%   Weight: 104.3 kg (230 lb)   Height: 1.778 m (5' 10\")     MEDICATIONS GIVEN TO PATIENT THIS ENCOUNTER:  Orders Placed This Encounter   Medications    DISCONTD: lidocaine 4 % external patch 1 patch    predniSONE (DELTASONE) tablet 50 mg    methylPREDNISolone (MEDROL, RAMEZ,) 4 MG tablet     Sig: Take by mouth.     Dispense:  1 kit     Refill:  0    lidocaine 4 % external patch     Sig: Place 1 patch onto the skin daily     Dispense:  30 patch     Refill:  0     DISCHARGE PRESCRIPTIONS:  Discharge Medication List as of 8/23/2024  5:54 PM        START taking these medications    Details   methylPREDNISolone (MEDROL, RAMEZ,) 4 MG tablet Take by mouth., Disp-1 kit, R-0Normal      lidocaine 4 % external patch Place 1 patch onto the skin daily, TransDERmal, DAILY Starting Fri 8/23/2024, Until Sun 9/22/2024, For 30 days, Disp-30 patch, R-0, Normal           PHYSICIAN CONSULTS ORDERED THIS ENCOUNTER:  None  FINAL IMPRESSION      1. Lumbar herniated disc    2. Lumbosacral strain, initial encounter          DISPOSITION/PLAN   DISPOSITION Decision To Discharge 08/23/2024 05:46:22 PM  Condition at Disposition: Stable      OUTPATIENT FOLLOW UP FOR THE PATIENT:  Jonel Polanco MD  [address] [place] [address]  [phone]    Schedule an appointment as soon as possible for a visit         PASTMEDICAL HISTORY     Past Medical History:   Diagnosis Date    Diverticulitis     HSV (herpes simplex virus) infection      Patient Active Problem List   Diagnosis Code    Perforation of sigmoid colon due to diverticulitis K57.20    Abscess of sigmoid colon due to diverticulitis K57.20

## 2024-09-25 PROBLEM — K57.20 ABSCESS OF SIGMOID COLON DUE TO DIVERTICULITIS: Status: RESOLVED | Noted: 2022-09-30 | Resolved: 2024-09-25

## 2024-09-25 PROBLEM — K57.20 PERFORATION OF SIGMOID COLON DUE TO DIVERTICULITIS: Status: RESOLVED | Noted: 2022-07-06 | Resolved: 2024-09-25

## 2025-03-31 ENCOUNTER — HOSPITAL ENCOUNTER (EMERGENCY)
Age: 35
Discharge: HOME | End: 2025-03-31
Attending: EMERGENCY MEDICINE
Payer: COMMERCIAL

## 2025-03-31 ENCOUNTER — APPOINTMENT (OUTPATIENT)
Dept: CT IMAGING | Age: 35
End: 2025-03-31
Payer: COMMERCIAL

## 2025-03-31 VITALS
SYSTOLIC BLOOD PRESSURE: 140 MMHG | WEIGHT: 220 LBS | BODY MASS INDEX: 31.57 KG/M2 | TEMPERATURE: 98.2 F | DIASTOLIC BLOOD PRESSURE: 97 MMHG | RESPIRATION RATE: 24 BRPM | OXYGEN SATURATION: 99 % | HEART RATE: 87 BPM

## 2025-03-31 DIAGNOSIS — R10.84 GENERALIZED ABDOMINAL PAIN: Primary | ICD-10-CM

## 2025-03-31 DIAGNOSIS — R19.7 NAUSEA VOMITING AND DIARRHEA: ICD-10-CM

## 2025-03-31 DIAGNOSIS — R11.2 NAUSEA VOMITING AND DIARRHEA: ICD-10-CM

## 2025-03-31 LAB
ALBUMIN SERPL-MCNC: 4.9 G/DL (ref 3.5–5.2)
ALBUMIN/GLOB SERPL: 1.5 {RATIO} (ref 1–2.5)
ALP SERPL-CCNC: 64 U/L (ref 40–129)
ALT SERPL-CCNC: 22 U/L (ref 10–50)
ANION GAP SERPL CALCULATED.3IONS-SCNC: 14 MMOL/L (ref 9–16)
AST SERPL-CCNC: 22 U/L (ref 10–50)
BASOPHILS # BLD: 0 K/UL (ref 0–0.2)
BASOPHILS NFR BLD: 0 % (ref 0–2)
BILIRUB SERPL-MCNC: 1 MG/DL (ref 0–1.2)
BILIRUB UR QL STRIP: NEGATIVE
BUN SERPL-MCNC: 16 MG/DL (ref 6–20)
CALCIUM SERPL-MCNC: 10 MG/DL (ref 8.6–10.4)
CHLORIDE SERPL-SCNC: 104 MMOL/L (ref 98–107)
CLARITY UR: CLEAR
CO2 SERPL-SCNC: 22 MMOL/L (ref 20–31)
COLOR UR: YELLOW
COMMENT: ABNORMAL
CREAT SERPL-MCNC: 1 MG/DL (ref 0.7–1.2)
EOSINOPHIL # BLD: 0 K/UL (ref 0–0.4)
EOSINOPHILS RELATIVE PERCENT: 0 % (ref 1–4)
ERYTHROCYTE [DISTWIDTH] IN BLOOD BY AUTOMATED COUNT: 12.9 % (ref 11.8–14.4)
GFR, ESTIMATED: >90 ML/MIN/1.73M2
GLUCOSE SERPL-MCNC: 131 MG/DL (ref 74–99)
GLUCOSE UR STRIP-MCNC: NEGATIVE MG/DL
HCT VFR BLD AUTO: 53.6 % (ref 40.7–50.3)
HGB BLD-MCNC: 18 G/DL (ref 13–17)
HGB UR QL STRIP.AUTO: NEGATIVE
IMM GRANULOCYTES # BLD AUTO: 0 K/UL (ref 0–0.3)
IMM GRANULOCYTES NFR BLD: 0 %
KETONES UR STRIP-MCNC: NEGATIVE MG/DL
LACTIC ACID, WHOLE BLOOD: 2.8 MMOL/L (ref 0.7–2.1)
LEUKOCYTE ESTERASE UR QL STRIP: NEGATIVE
LIPASE SERPL-CCNC: 21 U/L (ref 13–60)
LYMPHOCYTES NFR BLD: 0 K/UL (ref 1–4.8)
LYMPHOCYTES RELATIVE PERCENT: 0 % (ref 24–44)
MCH RBC QN AUTO: 29.4 PG (ref 25.2–33.5)
MCHC RBC AUTO-ENTMCNC: 33.6 G/DL (ref 28.4–34.8)
MCV RBC AUTO: 87.4 FL (ref 82.6–102.9)
MONOCYTES NFR BLD: 0.95 K/UL (ref 0.1–0.8)
MONOCYTES NFR BLD: 3 % (ref 1–7)
MORPHOLOGY: NORMAL
NEUTROPHILS NFR BLD: 97 % (ref 36–66)
NEUTS SEG NFR BLD: 30.85 K/UL (ref 1.8–7.7)
NITRITE UR QL STRIP: NEGATIVE
NRBC BLD-RTO: 0 PER 100 WBC
PH UR STRIP: 5.5 [PH] (ref 5–8)
PLATELET # BLD AUTO: 377 K/UL (ref 138–453)
PMV BLD AUTO: 9.5 FL (ref 8.1–13.5)
POTASSIUM SERPL-SCNC: 5 MMOL/L (ref 3.7–5.3)
PROT SERPL-MCNC: 8.1 G/DL (ref 6.6–8.7)
PROT UR STRIP-MCNC: NEGATIVE MG/DL
RBC # BLD AUTO: 6.13 M/UL (ref 4.21–5.77)
SODIUM SERPL-SCNC: 140 MMOL/L (ref 136–145)
SP GR UR STRIP: 1.08 (ref 1–1.03)
UROBILINOGEN UR STRIP-ACNC: NORMAL EU/DL (ref 0–1)
WBC OTHER # BLD: 31.8 K/UL (ref 3.5–11.3)

## 2025-03-31 PROCEDURE — 80053 COMPREHEN METABOLIC PANEL: CPT

## 2025-03-31 PROCEDURE — 6360000002 HC RX W HCPCS

## 2025-03-31 PROCEDURE — 2580000003 HC RX 258

## 2025-03-31 PROCEDURE — 6370000000 HC RX 637 (ALT 250 FOR IP)

## 2025-03-31 PROCEDURE — 83605 ASSAY OF LACTIC ACID: CPT

## 2025-03-31 PROCEDURE — 83690 ASSAY OF LIPASE: CPT

## 2025-03-31 PROCEDURE — 99285 EMERGENCY DEPT VISIT HI MDM: CPT

## 2025-03-31 PROCEDURE — 96375 TX/PRO/DX INJ NEW DRUG ADDON: CPT

## 2025-03-31 PROCEDURE — 87040 BLOOD CULTURE FOR BACTERIA: CPT

## 2025-03-31 PROCEDURE — 96374 THER/PROPH/DIAG INJ IV PUSH: CPT

## 2025-03-31 PROCEDURE — 74177 CT ABD & PELVIS W/CONTRAST: CPT

## 2025-03-31 PROCEDURE — 81003 URINALYSIS AUTO W/O SCOPE: CPT

## 2025-03-31 PROCEDURE — 6360000004 HC RX CONTRAST MEDICATION

## 2025-03-31 PROCEDURE — 85025 COMPLETE CBC W/AUTO DIFF WBC: CPT

## 2025-03-31 RX ORDER — IOPAMIDOL 755 MG/ML
75 INJECTION, SOLUTION INTRAVASCULAR
Status: COMPLETED | OUTPATIENT
Start: 2025-03-31 | End: 2025-03-31

## 2025-03-31 RX ORDER — PROCHLORPERAZINE EDISYLATE 5 MG/ML
10 INJECTION INTRAMUSCULAR; INTRAVENOUS ONCE
Status: COMPLETED | OUTPATIENT
Start: 2025-03-31 | End: 2025-03-31

## 2025-03-31 RX ORDER — FENTANYL CITRATE 50 UG/ML
50 INJECTION, SOLUTION INTRAMUSCULAR; INTRAVENOUS ONCE
Status: DISCONTINUED | OUTPATIENT
Start: 2025-03-31 | End: 2025-03-31 | Stop reason: HOSPADM

## 2025-03-31 RX ORDER — ONDANSETRON 4 MG/1
4 TABLET, ORALLY DISINTEGRATING ORAL ONCE
Status: COMPLETED | OUTPATIENT
Start: 2025-03-31 | End: 2025-03-31

## 2025-03-31 RX ORDER — 0.9 % SODIUM CHLORIDE 0.9 %
1000 INTRAVENOUS SOLUTION INTRAVENOUS ONCE
Status: DISCONTINUED | OUTPATIENT
Start: 2025-03-31 | End: 2025-03-31

## 2025-03-31 RX ORDER — CIPROFLOXACIN 500 MG/1
500 TABLET, FILM COATED ORAL ONCE
Status: COMPLETED | OUTPATIENT
Start: 2025-03-31 | End: 2025-03-31

## 2025-03-31 RX ORDER — DIPHENHYDRAMINE HYDROCHLORIDE 50 MG/ML
25 INJECTION, SOLUTION INTRAMUSCULAR; INTRAVENOUS ONCE
Status: COMPLETED | OUTPATIENT
Start: 2025-03-31 | End: 2025-03-31

## 2025-03-31 RX ORDER — PROCHLORPERAZINE MALEATE 10 MG
10 TABLET ORAL EVERY 6 HOURS PRN
Qty: 20 TABLET | Refills: 0 | Status: SHIPPED | OUTPATIENT
Start: 2025-03-31 | End: 2025-04-05

## 2025-03-31 RX ORDER — 0.9 % SODIUM CHLORIDE 0.9 %
30 INTRAVENOUS SOLUTION INTRAVENOUS ONCE
Status: DISCONTINUED | OUTPATIENT
Start: 2025-03-31 | End: 2025-03-31

## 2025-03-31 RX ORDER — CIPROFLOXACIN 500 MG/1
500 TABLET, FILM COATED ORAL 2 TIMES DAILY
Qty: 28 TABLET | Refills: 0 | Status: SHIPPED | OUTPATIENT
Start: 2025-03-31 | End: 2025-04-14

## 2025-03-31 RX ORDER — 0.9 % SODIUM CHLORIDE 0.9 %
30 INTRAVENOUS SOLUTION INTRAVENOUS ONCE
Status: COMPLETED | OUTPATIENT
Start: 2025-03-31 | End: 2025-03-31

## 2025-03-31 RX ADMIN — DIPHENHYDRAMINE HYDROCHLORIDE 25 MG: 50 INJECTION INTRAMUSCULAR; INTRAVENOUS at 15:31

## 2025-03-31 RX ADMIN — PROCHLORPERAZINE EDISYLATE 10 MG: 5 INJECTION INTRAMUSCULAR; INTRAVENOUS at 15:31

## 2025-03-31 RX ADMIN — SODIUM CHLORIDE 2400 ML: 9 INJECTION, SOLUTION INTRAVENOUS at 17:00

## 2025-03-31 RX ADMIN — CIPROFLOXACIN HYDROCHLORIDE 500 MG: 500 TABLET, FILM COATED ORAL at 19:59

## 2025-03-31 RX ADMIN — IOPAMIDOL 75 ML: 755 INJECTION, SOLUTION INTRAVENOUS at 18:05

## 2025-03-31 RX ADMIN — ONDANSETRON 4 MG: 4 TABLET, ORALLY DISINTEGRATING ORAL at 14:56

## 2025-03-31 ASSESSMENT — PAIN SCALES - GENERAL
PAINLEVEL_OUTOF10: 2
PAINLEVEL_OUTOF10: 2

## 2025-03-31 ASSESSMENT — ENCOUNTER SYMPTOMS
NAUSEA: 1
ABDOMINAL PAIN: 1
VOMITING: 1
DIARRHEA: 1

## 2025-03-31 ASSESSMENT — PAIN DESCRIPTION - LOCATION: LOCATION: ABDOMEN

## 2025-03-31 ASSESSMENT — PAIN - FUNCTIONAL ASSESSMENT: PAIN_FUNCTIONAL_ASSESSMENT: 0-10

## 2025-03-31 NOTE — ED NOTES
Pt presented to ED via triage for the complaint of nausea, abd pain and diarrhea. Pt denies emesis. Pt states kids have norovirus. Pt ambulated with steady gait. Pt alert and oriented x 4. RR even and non labored.

## 2025-03-31 NOTE — DISCHARGE INSTRUCTIONS
You are offered admission to see GI in the morning for your diarrhea.  Your white blood cell count is very elevated, 3 times normal.  Is important that you follow-up with your primary care tomorrow.  Return to the emergency department if you decide that you would like to be admitted and see GI tomorrow    Avoid eating any spicy food, milk type products or drinks that have caffeine in it.  Take all medications as prescribed.  For pain use ibuprofen (Motrin) or acetaminophen (Tylenol), unless prescribed medications that have acetaminophen in it.  You can take over the counter acetaminophen tablets (1 - 2 tablets of the 500-mg strength every 6 hours) or ibuprofen tablets (2 tablets every 4 hours).    PLEASE RETURN TO THE EMERGENCY DEPARTMENT IMMEDIATELY for worsening symptoms, or if you develop any concerning symptoms such as: high fever not relieved by acetaminophen (Tylenol) and/or ibuprofen (Motrin), chills, shortness of breath, chest pain, persistent nausea and/or vomiting, numbness, weakness or tingling in the arms or legs or change in color of the extremities, changes in mental status, persistent headache, blurry vision.    Return within 8 - 12 hours if you have any of the following: worsening of pain in your abdomen, no food sounds good to you, you continue to vomit, pain goes to your back or testicles, have pain in the abdomen when going over a bump in the car or when you jump up and down, inability to urinate, unable to follow up with your physician, or other any other care or concern.

## 2025-03-31 NOTE — ED PROVIDER NOTES
[facility] EMERGENCY DEPARTMENT     Emergency Department     Faculty Attestation        I performed a history and physical examination of the patient and discussed management with the resident. I reviewed the resident’s note and agree with the documented findings and plan of care. Any areas of disagreement are noted on the chart. I was personally present for the key portions of any procedures. I have documented in the chart those procedures where I was not present during the key portions. I have reviewed the emergency nurses triage note. I agree with the chief complaint, past medical history, past surgical history, allergies, medications, social and family history as documented unless otherwise noted below.  For Physician Assistant/ Nurse Practitioner cases/documentation I have personally evaluated this patient and have completed at least one if not all key elements of the E/M (history, physical exam, and MDM). Additional findings are as noted.      Vital Signs: BP: 120/77  Pulse: 87  Respirations: 24  Temp:  (IVETTE due to actively vomiting) SpO2: 99 %  PCP:  Jade Jensen APRN - CNP  Note Started: 3/31/25, 3:39 PM EDT    Pertinent Comments:     Patient is a 34-year-old male who has been having abdominal pain as well as vomiting and diarrhea over the last 3 weeks and worsening and here for evaluation.   Subjective fevers and chills at home.   Denies any chest pain or shortness of breath and no urinary symptoms.   On exam lungs are clear to auscultation bilateral with midline trachea heart regular rate and rhythm.   Abdomen is soft but does have diffuse tenderness with previous history of sigmoid colectomy secondary to diverticulitis with abscess formation at a young age.   Strong distal pulses.    Assessment/Plan: Patient with concerning history and symptomatology going on for 3 weeks especially given previous abdominal surgery.   Will obtain abdominal 
       [facility] EMERGENCY DEPARTMENT  Emergency Department  Emergency Medicine Resident Turn-Over     Note Started: 5:52 PM EDT    Care of Christopher Purvis was assumed from Dr. Dias and is being seen for Abdominal Pain, Vomiting, and Diarrhea  .  The patient's initial evaluation and plan have been discussed with the prior provider who initially evaluated the patient.     EMERGENCY DEPARTMENT COURSE / MEDICAL DECISION MAKING:       MEDICATIONS GIVEN:  Orders Placed This Encounter   Medications    ondansetron (ZOFRAN-ODT) disintegrating tablet 4 mg    diphenhydrAMINE (BENADRYL) injection 25 mg    prochlorperazine (COMPAZINE) injection 10 mg    DISCONTD: fentaNYL (SUBLIMAZE) injection 50 mcg    DISCONTD: sodium chloride 0.9 % bolus 1,000 mL    DISCONTD: sodium chloride 0.9 % bolus 2,400 mL    sodium chloride 0.9 % bolus 2,400 mL    iopamidol (ISOVUE-370) 76 % injection 75 mL    prochlorperazine (COMPAZINE) 10 MG tablet     Sig: Take 1 tablet by mouth every 6 hours as needed (nausea)     Dispense:  20 tablet     Refill:  0    ciprofloxacin (CIPRO) tablet 500 mg     Antimicrobial Indications:   Intra-Abdominal Infection    ciprofloxacin (CIPRO) 500 MG tablet     Sig: Take 1 tablet by mouth 2 times daily for 14 days     Dispense:  28 tablet     Refill:  0       LABS / RADIOLOGY:     Labs Reviewed   CBC WITH AUTO DIFFERENTIAL - Abnormal; Notable for the following components:       Result Value    WBC 31.8 (*)     RBC 6.13 (*)     Hemoglobin 18.0 (*)     Hematocrit 53.6 (*)     Neutrophils % 97 (*)     Lymphocytes % 0 (*)     Eosinophils % 0 (*)     Neutrophils Absolute 30.85 (*)     Lymphocytes Absolute 0.00 (*)     Monocytes Absolute 0.95 (*)     All other components within normal limits   COMPREHENSIVE METABOLIC PANEL - Abnormal; Notable for the following components:    Glucose 131 (*)     All other components within normal limits   URINALYSIS WITH REFLEX TO CULTURE - Abnormal; Notable for the following components: 
and regular rhythm.      Pulses:           Radial pulses are 2+ on the right side and 2+ on the left side.        Dorsalis pedis pulses are 2+ on the right side and 2+ on the left side.      Heart sounds: Normal heart sounds.   Pulmonary:      Effort: Pulmonary effort is normal. No respiratory distress.      Breath sounds: Normal breath sounds.   Abdominal:      General: Bowel sounds are normal.      Palpations: Abdomen is soft.      Tenderness: There is abdominal tenderness in the right upper quadrant and right lower quadrant.   Musculoskeletal:         General: No tenderness. Normal range of motion.   Skin:     General: Skin is warm and dry.      Findings: No rash.   Neurological:      Mental Status: He is alert and oriented to person, place, and time.           DDX/DIAGNOSTIC RESULTS / EMERGENCY DEPARTMENT COURSE / MDM     Medical Decision Making  Amount and/or Complexity of Data Reviewed  Labs: ordered. Decision-making details documented in ED Course.  Radiology: ordered.    Risk  Prescription drug management.    34-year-old male presented to ED with abdominal pain.  Patient has nonbloody/nonbilious emesis and diarrhea.  Patient does have a significant past surgical history in 2022 for perforated complicated diverticulitis.  Patient has taken Zofran prior to arrival which has not resolved the symptoms.  Abdomen is soft and slightly tender to palpation over the right upper and lower quadrant.  Concern for possible adhesions, small bowel obstruction, large bowel obstruction, appendicitis, cholecystitis.  Will obtain CBC, CMP, lactate, lipase, UA and CT Abdo pelvis with IV contrast.          EMERGENCY DEPARTMENT COURSE:      ED Course as of 03/31/25 1901   Mon Mar 31, 2025   1554 CBC with Auto Differential(!!):    WBC 31.8(!!)   RBC 6.13(!)   Hemoglobin Quant 18.0(!)   Hematocrit 53.6(!)   MCV 87.4   MCH 29.4   MCHC 33.6   RDW 12.9   Platelet Count 377   MPV 9.5   NRBC Automated 0.0   Neutrophils % PENDING

## 2025-04-05 LAB
MICROORGANISM SPEC CULT: NORMAL
MICROORGANISM SPEC CULT: NORMAL
SERVICE CMNT-IMP: NORMAL
SERVICE CMNT-IMP: NORMAL
SPECIMEN DESCRIPTION: NORMAL
SPECIMEN DESCRIPTION: NORMAL

## 2025-07-10 ENCOUNTER — OFFICE VISIT (OUTPATIENT)
Dept: GASTROENTEROLOGY | Age: 35
End: 2025-07-10
Payer: COMMERCIAL

## 2025-07-10 VITALS
BODY MASS INDEX: 33.5 KG/M2 | HEIGHT: 70 IN | WEIGHT: 234 LBS | DIASTOLIC BLOOD PRESSURE: 79 MMHG | TEMPERATURE: 97.6 F | RESPIRATION RATE: 20 BRPM | SYSTOLIC BLOOD PRESSURE: 131 MMHG | HEART RATE: 70 BPM

## 2025-07-10 DIAGNOSIS — R10.9 ABDOMINAL DISCOMFORT: ICD-10-CM

## 2025-07-10 DIAGNOSIS — D72.829 LEUKOCYTOSIS, UNSPECIFIED TYPE: Primary | ICD-10-CM

## 2025-07-10 DIAGNOSIS — K57.90 DIVERTICULOSIS: ICD-10-CM

## 2025-07-10 PROCEDURE — G8417 CALC BMI ABV UP PARAM F/U: HCPCS | Performed by: INTERNAL MEDICINE

## 2025-07-10 PROCEDURE — 1036F TOBACCO NON-USER: CPT | Performed by: INTERNAL MEDICINE

## 2025-07-10 PROCEDURE — G2211 COMPLEX E/M VISIT ADD ON: HCPCS | Performed by: INTERNAL MEDICINE

## 2025-07-10 PROCEDURE — 99204 OFFICE O/P NEW MOD 45 MIN: CPT | Performed by: INTERNAL MEDICINE

## 2025-07-10 PROCEDURE — G8427 DOCREV CUR MEDS BY ELIG CLIN: HCPCS | Performed by: INTERNAL MEDICINE

## 2025-07-10 ASSESSMENT — ENCOUNTER SYMPTOMS
COUGH: 0
ABDOMINAL PAIN: 1
BLOOD IN STOOL: 0
NAUSEA: 0
ANAL BLEEDING: 0
VOICE CHANGE: 0
TROUBLE SWALLOWING: 0
RECTAL PAIN: 0
ABDOMINAL DISTENTION: 1
CHOKING: 0
BACK PAIN: 1
CONSTIPATION: 0
SORE THROAT: 0
DIARRHEA: 0
WHEEZING: 0
VOMITING: 0

## 2025-07-10 NOTE — PROGRESS NOTES
Reason for Referral:       Jade Jensen, APRN - CNP  [address]  [address]  [place] [id number]    Chief Complaint   Patient presents with    Abdominal Pain    Nausea     Emesis    New Patient     Hx Partial Colectomy, Diverticulosis       HISTORY OF PRESENT ILLNESS: Mr.Tate MARÍA Purvis is a 34 y.o. male with a past history remarkable for diverticulosis, sigmoid colectomy, obesity,, referred for evaluation of abdominal pain and diverticulosis.  The patient report that 3 years ago he had complicated diverticulitis with abscess.  He was given IV antibiotics.  Subsequently had a colonoscopy that showed diverticular disease.  He then underwent sigmoid colectomy.  His last colonoscopy was 2 years ago that was normal.    He was seen most recently in the ER for abdominal pain, nausea/vomiting and fever.  He also had a white count of 31.  He was given ciprofloxacin.    He currently only reports having abdominal discomfort and gas.  His discomfort improves when he passes gas.  He does notice that he does not have symptoms if he is consuming Metamucil.  No other symptoms reported.      Previous Endoscopies    Colonoscopy 2023:  Evidence of sigmoid colectomy.  Diverticulosis    Previous GI workup       Past Medical,Family, and Social History reviewed and does not contribute to the patient presentingcondition.    Patient's PMH/PSH,SH,PSYCH Hx, MEDs, ALLERGIES, and ROS were all reviewed and updated in the appropriate sections.    PAST MEDICAL HISTORY:  Past Medical History:   Diagnosis Date    Abscess of sigmoid colon due to diverticulitis 09/30/2022    Diverticulitis     HSV (herpes simplex virus) infection     Perforation of sigmoid colon due to diverticulitis 07/06/2022       Past Surgical History:   Procedure Laterality Date    COLONOSCOPY N/A 9/27/2022    COLONOSCOPY DIAGNOSTIC performed by Arthur Fuller MD at [facility] ENDO    COLONOSCOPY N/A 9/5/2023    COLONOSCOPY POLYPECTOMY COLD BIOPSY performed by Arthur Fuller,

## (undated) DEVICE — GLOVE SURG SZ 65 L12IN FNGR THK79MIL GRN LTX FREE

## (undated) DEVICE — GLOVE ORANGE PI 7 1/2   MSG9075

## (undated) DEVICE — SEALER ENDOSCP NANO COAT OPN DIV CRV L JAW LIGASURE IMPACT

## (undated) DEVICE — Z DISCONTINUED PER MEDLINE USE 2425483 TAPE UMB L30IN DIA1/8IN WHT COT NONABSORBABLE W/O NDL FOR

## (undated) DEVICE — SUTURE PDS II SZ 0 L60IN ABSRB VLT L48MM CTX 1/2 CIR Z990G

## (undated) DEVICE — STAPLER INT DIA25MM CLS STPL H1.5-2.2MM OPN LEG L5.2MM 22

## (undated) DEVICE — BLADE ES L6IN ELASTOMERIC COAT EXT DURABLE BEND UPTO 90DEG

## (undated) DEVICE — YANKAUER,POOLE TIP,STERILE,50/CS: Brand: MEDLINE

## (undated) DEVICE — PAD,NON-ADHERENT,3X8,STERILE,LF,1/PK: Brand: MEDLINE

## (undated) DEVICE — RELOAD STPL 3.5MM L60MM 0DEG UNIV TISS PUR TI 6 ROW LIN

## (undated) DEVICE — SOLUTION IRRIG 1000ML STRL H2O USP PLAS POUR BTL

## (undated) DEVICE — DEFENDO AIR WATER SUCTION AND BIOPSY VALVE KIT FOR  OLYMPUS: Brand: DEFENDO AIR/WATER/SUCTION AND BIOPSY VALVE

## (undated) DEVICE — SYRINGE IRRIG 60ML SFT PLIABLE BLB EZ TO GRP 1 HND USE W/

## (undated) DEVICE — ST CHARLES CYSTO PACK: Brand: MEDLINE INDUSTRIES, INC.

## (undated) DEVICE — GLOVE SURG SZ 65 THK91MIL LTX FREE SYN POLYISOPRENE

## (undated) DEVICE — GOWN,SLEEVE,STERILE,W/CSR WRAP,1/P: Brand: MEDLINE

## (undated) DEVICE — STAPLER SKIN REG CLSR N ABSRB S STL 35 COUNT FIX HD HND GRP

## (undated) DEVICE — SUTURE PDS + SZ 4 0 L27IN ABSRB VLT L26MM SH 1 2 CIR PDP315H

## (undated) DEVICE — RELOAD STPL 2.5MM L60MM 0DEG VASC TISS TAN TI 6 ROW LIN

## (undated) DEVICE — KIT DRN FLAT W/ 100CC EVAC 10MM FULL PERF

## (undated) DEVICE — SEALER LAP L20CM SHFT DIA10MM TISS FUS OPN INSTR STR BILAT

## (undated) DEVICE — SHEET,DRAPE,UNDERBUTTOCK,GRAD POUCH,PORT: Brand: MEDLINE

## (undated) DEVICE — MERCY HEALTH ST CHARLES: Brand: MEDLINE INDUSTRIES, INC.

## (undated) DEVICE — METER,URINE,400ML,DRAIN BAG,L/F,LL: Brand: MEDLINE

## (undated) DEVICE — ENDO KIT W/SYRINGE: Brand: MEDLINE INDUSTRIES, INC.

## (undated) DEVICE — COVER,MAYO STAND,XL,STERILE: Brand: MEDLINE

## (undated) DEVICE — STAPLER INT L75MM CUT LN L73MM STPL LN L77MM BLU B FRM 8

## (undated) DEVICE — DRESSING TRNSPAR W4XL10IN FLM MIC POR SURESITE 123

## (undated) DEVICE — CATHETER URETH 16FR BLLN 5CC SIL ALLY W/ SIL HYDRGEL 2 W F

## (undated) DEVICE — SPONGE DRN W4XL4IN RAYON/POLYESTER 6 PLY NONWOVEN PRECUT

## (undated) DEVICE — GOWN,SIRUS,NONRNF,SETINSLV,XL,20/CS: Brand: MEDLINE

## (undated) DEVICE — DRAPE,MEDI-SLUSH,STERILE: Brand: MEDLINE

## (undated) DEVICE — SINGLE PORT MANIFOLD: Brand: NEPTUNE 2

## (undated) DEVICE — CATHETER IV 14GA L1.75IN OD2.146MM ID1.740MM ORNG VIALON

## (undated) DEVICE — SPONGE LAP W18XL18IN WHT COT 4 PLY FLD STRUNG RADPQ DISP ST

## (undated) DEVICE — KIT CLN UP LIN W/ STD SAHARA TBL SHT 40X60IN DRAW/LIFT SHT

## (undated) DEVICE — SOLUTION SCRB 4OZ 4% CHG H2O AIDED FOR PREOPERATIVE SKIN

## (undated) DEVICE — STAPLER INT CUT LN 51MM STPL 51MM BLU CRV HD B FRM

## (undated) DEVICE — GOWN,AURORA,NONREINFORCED,LARGE: Brand: MEDLINE

## (undated) DEVICE — Device

## (undated) DEVICE — SOLUTION IRRIG 3000ML 0.9% SOD CHL USP UROMATIC PLAS CONT

## (undated) DEVICE — LEGGINGS, PAIR, 33X51 XL, STERILE: Brand: MEDLINE

## (undated) DEVICE — STAPLER EXT 65MM S STL AUTO DISP PURSTRING

## (undated) DEVICE — CATHETER URET 5FR L70CM OPN END SGL LUMN INJ HUB FLEXIMA

## (undated) DEVICE — BLANKET WRM W29.9XL79.1IN UP BODY FORC AIR MISTRAL-AIR

## (undated) DEVICE — DRESSING TRNSPAR W5XL4.5IN FLM SHT SEMIPERMEABLE WIND

## (undated) DEVICE — SUTURE PERMAHAND SZ 0 L30IN NONABSORBABLE BLK FSL L30MM 3/8 680H

## (undated) DEVICE — BINDER ABD UNIV H9IN WAIST 45-62IN E SFT COT PREM 3 PNL

## (undated) DEVICE — PREMIUM DRY TRAY LF: Brand: MEDLINE INDUSTRIES, INC.

## (undated) DEVICE — GUIDEWIRE URO L150CM DIA .035IN STIFF NIT HYDRPHL STR TIP

## (undated) DEVICE — FORCEPS BX L240CM WRK CHN 2.8MM STD CAP W/ NDL MIC MESH

## (undated) DEVICE — GLOVE ORTHO 7 1/2   MSG9475

## (undated) DEVICE — ST CHARLES MAJOR ABDOMINAL PK: Brand: MEDLINE INDUSTRIES, INC.

## (undated) DEVICE — STAPLER INT 12MM 60MM CART SHT NEW KNF BLDE W/ EVERY FIRING

## (undated) DEVICE — SOLUTION IRRIG 1000ML 0.9% SOD CHL USP POUR PLAS BTL